# Patient Record
Sex: FEMALE | Employment: OTHER | URBAN - METROPOLITAN AREA
[De-identification: names, ages, dates, MRNs, and addresses within clinical notes are randomized per-mention and may not be internally consistent; named-entity substitution may affect disease eponyms.]

---

## 2018-02-23 ENCOUNTER — OFFICE VISIT (OUTPATIENT)
Dept: CARDIOLOGY CLINIC | Age: 83
End: 2018-02-23

## 2018-02-23 VITALS
HEIGHT: 64 IN | SYSTOLIC BLOOD PRESSURE: 140 MMHG | BODY MASS INDEX: 39.13 KG/M2 | HEART RATE: 74 BPM | DIASTOLIC BLOOD PRESSURE: 62 MMHG | RESPIRATION RATE: 18 BRPM | WEIGHT: 229.2 LBS | OXYGEN SATURATION: 98 %

## 2018-02-23 DIAGNOSIS — R06.02 SOB (SHORTNESS OF BREATH): Primary | ICD-10-CM

## 2018-02-23 DIAGNOSIS — E11.9 TYPE 2 DIABETES MELLITUS WITHOUT COMPLICATION, UNSPECIFIED LONG TERM INSULIN USE STATUS: ICD-10-CM

## 2018-02-23 DIAGNOSIS — I10 ESSENTIAL HYPERTENSION: ICD-10-CM

## 2018-02-23 NOTE — PROGRESS NOTES
Richard Silva DNP, ANP-BC  Subjective/HPI:     Pardeep Justice is a 80 y.o. female is here for new patient consultation. She reports progressive dyspnea on exertion over the last few months, she is accompanied by her daughter who also reports she has been having some increasing fatigue. Patient denies exertional chest pain chest pressure or fullness sensation. She also states on occasion she has waxing and waning right hand numbness and tingling that does not occur with any particular activity. She denies known family history of heart disease. Patient herself has a history of diabetes, hypertension. She is a non-smoker but had secondhand exposure during marriage is greater than 50 years. PCP Provider  Burgess Luis Miguel MD  Past Medical History:   Diagnosis Date    Anxiety     Diabetes ([address]     Hypertension       Past Surgical History:   Procedure Laterality Date    HX CATARACT REMOVAL Right 4/2016    HX GYN      Vaginal delivery 5 girls and 3 boys     Allergies   Allergen Reactions    Ace Inhibitors Cough    Penicillins Rash      History reviewed. No pertinent family history. Current Outpatient Prescriptions   Medication Sig    alendronate (FOSAMAX) 70 mg tablet Take  by mouth. Every 14 days    omega-3 fatty acids-vitamin e 1,000 mg cap Take 1 Cap by mouth daily.  losartan (COZAAR) 50 mg tablet Take  by mouth every morning.  citalopram (CELEXA) 20 mg tablet Take  by mouth daily.  furosemide (LASIX) 20 mg tablet Take 40 mg by mouth daily.  levothyroxine (SYNTHROID) 75 mcg tablet Take  by mouth Daily (before breakfast).  Calcium-Cholecalciferol, D3, 600 mg(1,500mg) -400 unit cap Take  by mouth daily.  cholecalciferol (VITAMIN D3) 1,000 unit cap Take  by mouth daily.  verapamil SR (CALAN-SR) 180 mg CR tablet Take 180 mg by mouth daily (with dinner).  glyBURIDE (DIABETA) 5 mg tablet Take 2.5 mg by mouth Daily (before breakfast).      No current facility-administered medications for this visit. Vitals:    02/23/18 1432 02/23/18 1448   BP: 142/60 140/62   Pulse: 74    Resp: 18    SpO2: 98%    Weight: 229 lb 3.2 oz (104 kg)    Height: 5' 4\" (1.626 m)      Social History     Social History    Marital status:      Spouse name: N/A    Number of children: N/A    Years of education: N/A     Occupational History    Not on file. Social History Main Topics    Smoking status: Never Smoker    Smokeless tobacco: Not on file    Alcohol use No    Drug use: No    Sexual activity: Not on file     Other Topics Concern    Not on file     Social History Narrative       I have reviewed the nurses notes, vitals, problem list, allergy list, medical history, family, social history and medications. Review of Symptoms:    General: Pt denies excessive weight gain or loss. Pt is able to conduct ADL's  HEENT: Denies blurred vision, headaches, epistaxis and difficulty swallowing. Respiratory: Denies shortness of breath, + PIMENTEL, wheezing or stridor. Cardiovascular: Denies precordial pain, palpitations, edema or PND  Gastrointestinal: Denies poor appetite, indigestion, abdominal pain or blood in stool  Musculoskeletal: Denies pain or swelling from muscles or joints  Neurologic: Denies tremor, paresthesias, or sensory motor disturbance  Skin: Denies rash, itching or texture change. Physical Exam:      General: Well developed, in no acute distress, cooperative and alert  HEENT: No carotid bruits, no JVD, trach is midline. Neck Supple, PEERL, EOM intact. Heart:  Normal S1/S2 negative S3 or S4. Regular, no murmur, gallop or rub.   Respiratory: Clear bilaterally x 4, no wheezing or rales  Abdomen:   Soft, non-tender, no masses, bowel sounds are active.   Extremities:  No edema, normal cap refill, no cyanosis, atraumatic. Neuro: A&Ox3, speech clear, gait stable. Skin: Skin color is normal. No rashes or lesions.  Non diaphoretic  Vascular: 2+ pulses symmetric in all extremities    Cardiographics    ECG: sinus rythm  Results for orders placed or performed during the hospital encounter of 12/30/11   EKG, 12 LEAD, INITIAL   Result Value Ref Range    Ventricular Rate 59 BPM    Atrial Rate 59 BPM    P-R Interval 334 ms    QRS Duration 96 ms    Q-T Interval 450 ms    QTC Calculation (Bezet) 445 ms    Calculated P Axis 37 degrees    Calculated R Axis -22 degrees    Calculated T Axis -41 degrees    Diagnosis       Sinus bradycardia with 1st degree AV block  Incomplete right bundle branch block  Cannot rule out Anterior infarct , age undetermined  No previous ECGs available  Confirmed by Wes Ryan ([id number]) on 1/3/2012 9:43:01 AM         Cardiology Labs:  No results found for: CHOL, CHOLX, CHLST, CHOLV, 440560, HDL, LDL, LDLC, DLDLP, TGLX, TRIGL, TRIGP, [facility], [facility]    Lab Results   Component Value Date/Time    Sodium 143 12/30/2011 04:40 PM    Potassium 3.5 12/30/2011 04:40 PM    Chloride 109 (H) 12/30/2011 04:40 PM    CO2 27 12/30/2011 04:40 PM    Anion gap 7 12/30/2011 04:40 PM    Glucose 54 (L) 12/30/2011 04:40 PM    BUN 16 12/30/2011 04:40 PM    Creatinine 1.0 12/30/2011 04:40 PM    BUN/Creatinine ratio 16 12/30/2011 04:40 PM    GFR est AA >60 12/30/2011 04:40 PM    GFR est non-AA 57 (L) 12/30/2011 04:40 PM    Calcium 8.7 12/30/2011 04:40 PM           Assessment:     Assessment:     Diagnoses and all orders for this visit:    1. SOB (shortness of breath)  -     AMB POC EKG ROUTINE W/ 12 LEADS, INTER & REP  -     2D ECHO COMPLETE ADULT (TTE) W OR WO CONTR; Future  -     LEXISCAN/CARDIOLITE, Clinic Performed; Future    2. Essential hypertension  -     2D ECHO COMPLETE ADULT (TTE) W OR WO CONTR; Future  -     LEXISCAN/CARDIOLITE, Clinic Performed; Future    3. Type 2 diabetes mellitus without complication, unspecified long term insulin use status ([facility] [address]  -     2D ECHO COMPLETE ADULT (TTE) W OR WO CONTR; Future  -     LEXISCAN/CARDIOLITE, Clinic Performed;  Future        ICD-10-CM ICD-9-CM    1. SOB (shortness of breath) R06.02 786.05 AMB POC EKG ROUTINE W/ 12 LEADS, INTER & REP      2D ECHO COMPLETE ADULT (TTE) W OR WO CONTR      STRESS TEST LEXISCAN/CARDIOLITE   2. Essential hypertension I10 401.9 2D ECHO COMPLETE ADULT (TTE) W OR WO CONTR      STRESS TEST LEXISCAN/CARDIOLITE   3. Type 2 diabetes mellitus without complication, unspecified long term insulin use status (HCC) E11.9 250.00 2D ECHO COMPLETE ADULT (TTE) W OR WO CONTR      STRESS TEST LEXISCAN/CARDIOLITE     Orders Placed This Encounter    AMB POC EKG ROUTINE W/ 12 LEADS, INTER & REP     Order Specific Question:   Reason for Exam:     Answer:   Routine    LEXISCAN/CARDIOLITE, Clinic Performed     Standing Status:   Future     Standing Expiration Date:   8/23/2018     Order Specific Question:   Reason for Exam:     Answer:   PIMENTEL    2D ECHO COMPLETE ADULT (TTE) W OR WO CONTR     Standing Status:   Future     Standing Expiration Date:   8/23/2018     Order Specific Question:   Reason for Exam:     Answer:   PIMENTEL     Order Specific Question:   Contrast Enhancement (Bubble Study, Definity, Optison) may be used if criteria listed in established evidence-based protocol has been identified. Answer:   Yes        Plan:     Patient is a 75-year-old female with history of diabetes, hypertension presenting for evaluation of increasing dyspnea on exertion. Given cardiac risk factors will evaluate with Lexiscan nuclear stress test and echocardiogram.  Recommend patient to continue enteric-coated 81 mg aspirin therapy. Follow-up with cardiology when testing complete. Dee Trinh NP    This note was created using voice recognition software. Despite editing, there may be syntax errors. [facility]    2/25/2018         Patient seen, examined by me personally. Plan discussed as detailed. Agree with note as outlined by  NP. I confirm findings in history and physical exam. No additional findings noted.  Agree with plan as outlined above.      Mona Manning MD

## 2018-02-23 NOTE — PROGRESS NOTES
1. Have you been to the ER, urgent care clinic since your last visit? Hospitalized since your last visit? No    2. Have you seen or consulted any other health care providers outside of the [address] [place] [address] since your last visit? Include any pap smears or colon screening.  No    Chief Complaint   Patient presents with    Establish Care     new pt    Shortness of Breath    Leg Swelling     to left leg

## 2019-09-27 LAB
HBA1C MFR BLD HPLC: 6.1 %
LDL-C, EXTERNAL: 105

## 2019-12-04 LAB — LDL-C, EXTERNAL: 94

## 2019-12-11 ENCOUNTER — OFFICE VISIT (OUTPATIENT)
Dept: FAMILY MEDICINE CLINIC | Age: 84
End: 2019-12-11

## 2019-12-11 VITALS
BODY MASS INDEX: 33.46 KG/M2 | HEART RATE: 55 BPM | RESPIRATION RATE: 19 BRPM | WEIGHT: 196 LBS | TEMPERATURE: 98.8 F | DIASTOLIC BLOOD PRESSURE: 78 MMHG | HEIGHT: 64 IN | SYSTOLIC BLOOD PRESSURE: 154 MMHG | OXYGEN SATURATION: 98 %

## 2019-12-11 DIAGNOSIS — I10 ESSENTIAL HYPERTENSION: ICD-10-CM

## 2019-12-11 DIAGNOSIS — E78.2 MIXED HYPERLIPIDEMIA: ICD-10-CM

## 2019-12-11 DIAGNOSIS — E03.9 ACQUIRED HYPOTHYROIDISM: ICD-10-CM

## 2019-12-11 DIAGNOSIS — E11.9 TYPE 2 DIABETES MELLITUS WITHOUT COMPLICATION, UNSPECIFIED WHETHER LONG TERM INSULIN USE (HCC): Primary | ICD-10-CM

## 2019-12-11 PROBLEM — F32.A DEPRESSIVE DISORDER: Status: ACTIVE | Noted: 2019-12-11

## 2019-12-11 RX ORDER — FUROSEMIDE 40 MG/1
TABLET ORAL
COMMUNITY
Start: 2019-11-03 | End: 2020-01-15 | Stop reason: SDUPTHER

## 2019-12-11 RX ORDER — ASPIRIN 81 MG/1
TABLET ORAL DAILY
COMMUNITY
End: 2020-11-30 | Stop reason: SDUPTHER

## 2019-12-11 RX ORDER — DICLOFENAC SODIUM 10 MG/G
2 GEL TOPICAL 4 TIMES DAILY
Qty: 100 G | Refills: 3 | Status: SHIPPED | OUTPATIENT
Start: 2019-12-11 | End: 2020-01-02 | Stop reason: SDUPTHER

## 2019-12-11 NOTE — PROGRESS NOTES
Chief Complaint   Patient presents with   [facility] Maintenance reviewed     1. Have you been to the ER, urgent care clinic since your last visit? Hospitalized since your last visit? no    2. Have you seen or consulted any other health care providers outside of the [address] [place] [address] since your last visit? Include any pap smears or colon screening.  No

## 2019-12-11 NOTE — PROGRESS NOTES
Chief Complaint   Patient presents with   [address]     Pt is transferring care from Dr. Ammon Pitt. Pt reports that she has knee problems, but does not want to have any surgery. Pt did have a knee injection which provided significant relief. Pt is taking all medications listed. Pt walks with a cane, is in a wheelchair during her visit. Pt stated that she has a strong ramiro which will take care of her. Subjective: (As above and below)     Chief Complaint   Patient presents with   [address]     she is a 80y.o. year old female who presents for evaluation. Reviewed PmHx, RxHx, FmHx, SocHx, AllgHx and updated in chart. Review of Systems - negative except as listed above    Objective:     Vitals:    12/11/19 1032   BP: 154/78   Pulse: (!) 55   Resp: 19   Temp: 98.8 °F (37.1 °C)   TempSrc: Oral   SpO2: 98%   Weight: 196 lb (88.9 kg)   Height: 5' 4\" (1.626 m)     Physical Examination: General appearance - alert, well appearing, and in no distress  Mental status - normal mood, behavior, speech, dress, motor activity, and thought processes  Mouth - mucous membranes moist, pharynx normal without lesions  Chest - clear to auscultation, no wheezes, rales or rhonchi, symmetric air entry  Heart - normal rate, regular rhythm, normal S1, S2, no murmurs, rubs, clicks or gallops  Musculoskeletal - in a wheelchair, walks with a cane  Extremities - peripheral pulses normal, no pedal edema, no clubbing or cyanosis    Assessment/ Plan:   1. Type 2 diabetes mellitus without complication, unspecified whether long term insulin use ([facility] [address]  -pt denies taking medication, reports that she is diet controlled only    2. Essential hypertension  -elevated today    3. Mixed hyperlipidemia  -labs UTD per records    4. Acquired hypothyroidism  -continue on current medications       I have discussed the diagnosis with the patient and the intended plan as seen in the above orders.   The patient has received an after-visit summary and questions were answered concerning future plans.      Medication Side Effects and Warnings were discussed with patient: yes  Patient Labs were reviewed: yes  Patient Past Records were reviewed:  yes    Missy Dior M.D.

## 2020-01-02 RX ORDER — DICLOFENAC SODIUM 10 MG/G
2 GEL TOPICAL 4 TIMES DAILY
Qty: 100 G | Refills: 3 | Status: SHIPPED | OUTPATIENT
Start: 2020-01-02

## 2020-01-02 NOTE — TELEPHONE ENCOUNTER
Message is from [facility]    Dr. Ann-Marie Carpenter   Received: Today   Message Contents   [address] [facility]   Phone Number: [phone]               Caller (if not patient): Timbo Nolan   Relationship of caller (if not patient): daughter   Best contact number(s): [phone]   Name of medication and dosage if known: \"diclofenac gel 1%\"  rx #[id number]   Is patient out of this medication (yes/no): Yes   Pharmacy name: [facility]   Pharmacy listed in chart? (yes/no): Yes   Pharmacy phone number: unknown   Date of last visit: 12/11/19   Details to clarify the request: This is the 3rd request for mail ordered meds.  Pharmacy will not refill meds because they need more information from the doctor. Requested Prescriptions     Pending Prescriptions Disp Refills    diclofenac (VOLTAREN) 1 % gel 100 g 3     Sig: Apply 2 g to affected area four (4) times daily.

## 2020-01-15 NOTE — TELEPHONE ENCOUNTER
Daughter is calling requesting a refill on pt's med daughter's # [phone]    Requested Prescriptions     Pending Prescriptions Disp Refills    levothyroxine (SYNTHROID) 75 mcg tablet       Sig: Take  by mouth Daily (before breakfast).  citalopram (CELEXA) 20 mg tablet       Sig: Take  by mouth daily.  furosemide (LASIX) 40 mg tablet      verapamil ER (CALAN-SR) 180 mg CR tablet       Sig: Take 1 Tab by mouth daily (with dinner).  losartan (COZAAR) 50 mg tablet       Sig: Take  by mouth every morning.

## 2020-01-17 RX ORDER — LOSARTAN POTASSIUM 50 MG/1
50 TABLET ORAL
Qty: 90 TAB | Refills: 3 | Status: SHIPPED | OUTPATIENT
Start: 2020-01-17 | End: 2020-04-23 | Stop reason: SDUPTHER

## 2020-01-17 RX ORDER — FUROSEMIDE 40 MG/1
40 TABLET ORAL DAILY
Qty: 90 TAB | Refills: 3 | Status: SHIPPED | OUTPATIENT
Start: 2020-01-17 | End: 2020-04-23 | Stop reason: SDUPTHER

## 2020-01-17 RX ORDER — CITALOPRAM 20 MG/1
20 TABLET, FILM COATED ORAL DAILY
Qty: 90 TAB | Refills: 3 | Status: SHIPPED | OUTPATIENT
Start: 2020-01-17 | End: 2020-04-23 | Stop reason: SDUPTHER

## 2020-01-17 RX ORDER — VERAPAMIL HYDROCHLORIDE 180 MG/1
180 TABLET, EXTENDED RELEASE ORAL
Qty: 90 TAB | Refills: 3 | Status: SHIPPED | OUTPATIENT
Start: 2020-01-17 | End: 2020-04-23 | Stop reason: SDUPTHER

## 2020-01-17 RX ORDER — LEVOTHYROXINE SODIUM 75 UG/1
75 TABLET ORAL
Qty: 90 TAB | Refills: 3 | Status: SHIPPED | OUTPATIENT
Start: 2020-01-17 | End: 2020-04-23 | Stop reason: SDUPTHER

## 2020-01-23 ENCOUNTER — TELEPHONE (OUTPATIENT)
Dept: FAMILY MEDICINE CLINIC | Age: 85
End: 2020-01-23

## 2020-01-23 NOTE — TELEPHONE ENCOUNTER
Message from [facility]    Dr. Wilson/ Telephone   Received: Today   Message Contents   [place], [address]   Phone Number:  [phone] (Call me)             Silvestre Mckeon, patient's daughter is returning a call from the office . If there is no answer please call [phone].

## 2020-01-23 NOTE — TELEPHONE ENCOUNTER
She did call insurance and was told she needs to call medicaid and social security. In the mean time she is requesting all six of her prescriptions to all be generic if possible. oJhn Fonseca requests a callback.     # [phone] or  [phone]

## 2020-01-23 NOTE — TELEPHONE ENCOUNTER
Caller says she recently requested refills on behalf of patient. When patient received the medication, there was a copay. Caller says they have not had to pay a copay before. Is there a difference in charge for generic vs brand name?   # [phone]

## 2020-01-23 NOTE — TELEPHONE ENCOUNTER
Please advise that all medications written are written as generic medications. Was there a particular medication that was more expensive?

## 2020-01-24 NOTE — TELEPHONE ENCOUNTER
Called pts daughter, verified name and . Informed pt that per Dr. Dottie Sanders she needs to reach out to her insurance company regarding wheelchair request, will also need to schedule face-to-face evaluation with our office. We will also discuss need for Ensure at that time. Daughter stated that she informed the nurse she spoke with this morning that she has already called her insurance co and they are saying that they need a letter from the doctor stating that she needs a wheelchair. Daughter will call back later this week and schedule an appt once she knows her schedule.

## 2020-01-24 NOTE — TELEPHONE ENCOUNTER
Please advise patient to reach out to her insurance company regarding wheelchair request, will also need to schedule face-to-face evaluation with our office. We will also discuss need for Ensure at that time.

## 2020-01-24 NOTE — TELEPHONE ENCOUNTER
Called both numbers listed on file.   LM to return call to [phone]  VM full on [phone]

## 2020-01-24 NOTE — TELEPHONE ENCOUNTER
verified by daughter. She request an order for motorized wheelchair r/t knees, leg, and back pain. Informed daughter that a face-to-face is usually required. Daughter states that she will schedule an appointment if that is required. She also requests Rx for Ensure since patient for lack of appetite.

## 2020-02-21 ENCOUNTER — TELEPHONE (OUTPATIENT)
Dept: FAMILY MEDICINE CLINIC | Age: 85
End: 2020-02-21

## 2020-02-21 NOTE — TELEPHONE ENCOUNTER
Called pt, verified name and . She stated she is having pains in chest and sob but she just wants us to call in something for her, she does not want to come in and be seen. I explained to her that we would have to evaluate her so that we could know exactly what is going on. I also recommended her calling [facility] if symptoms got worse. Pt denied any help or care.

## 2020-02-21 NOTE — TELEPHONE ENCOUNTER
Caller is not listed on patient's PHI. Caller requested a nurse give patient a call at [phone] to discuss pains in chest.  Caller would like nurse to convince patient to be seen by a doctor.

## 2020-04-23 RX ORDER — ALENDRONATE SODIUM 70 MG/1
70 TABLET ORAL
Qty: 12 TAB | Refills: 1 | Status: SHIPPED | OUTPATIENT
Start: 2020-04-23 | End: 2020-11-30 | Stop reason: SDUPTHER

## 2020-04-23 RX ORDER — FUROSEMIDE 40 MG/1
40 TABLET ORAL DAILY
Qty: 90 TAB | Refills: 1 | Status: SHIPPED | OUTPATIENT
Start: 2020-04-23 | End: 2020-11-30 | Stop reason: SDUPTHER

## 2020-04-23 RX ORDER — LEVOTHYROXINE SODIUM 75 UG/1
75 TABLET ORAL
Qty: 90 TAB | Refills: 1 | Status: SHIPPED | OUTPATIENT
Start: 2020-04-23 | End: 2020-11-30 | Stop reason: SDUPTHER

## 2020-04-23 RX ORDER — CITALOPRAM 20 MG/1
20 TABLET, FILM COATED ORAL DAILY
Qty: 90 TAB | Refills: 1 | Status: SHIPPED | OUTPATIENT
Start: 2020-04-23 | End: 2020-11-30 | Stop reason: SDUPTHER

## 2020-04-23 RX ORDER — LOSARTAN POTASSIUM 50 MG/1
50 TABLET ORAL
Qty: 90 TAB | Refills: 1 | Status: SHIPPED | OUTPATIENT
Start: 2020-04-23 | End: 2020-11-30 | Stop reason: SDUPTHER

## 2020-04-23 RX ORDER — VERAPAMIL HYDROCHLORIDE 180 MG/1
180 TABLET, EXTENDED RELEASE ORAL
Qty: 90 TAB | Refills: 1 | Status: SHIPPED | OUTPATIENT
Start: 2020-04-23 | End: 2020-11-30 | Stop reason: SDUPTHER

## 2020-04-23 NOTE — TELEPHONE ENCOUNTER
Message from Kamilla Wilson/ refill   Received: Erinn Estrada   Message Contents   [place], [address] Office Pool             Medication Refill     Caller (if not patient):       Relationship of caller (if not patient):Betty Awad  , daughter       Best contact number(s): [phone]       Name of medication and dosage if known: All six medications       Is patient out of this medication (yes/no):no       Pharmacy name: [address] listed in chart? (yes/no):yes   Pharmacy phone number:       Details to clarify the request:       Katie Katz        Requested Prescriptions     Pending Prescriptions Disp Refills    verapamil ER (CALAN-SR) 180 mg CR tablet 90 Tab 3     Sig: Take 1 Tab by mouth daily (with dinner).  losartan (COZAAR) 50 mg tablet 90 Tab 3     Sig: Take 1 Tab by mouth every morning.  levothyroxine (SYNTHROID) 75 mcg tablet 90 Tab 3     Sig: Take 1 Tab by mouth Daily (before breakfast).  furosemide (LASIX) 40 mg tablet 90 Tab 3     Sig: Take 1 Tab by mouth daily.  citalopram (CELEXA) 20 mg tablet 90 Tab 3     Sig: Take 1 Tab by mouth daily.  alendronate (FOSAMAX) 70 mg tablet       Sig: Take  by mouth.  Every 14 days

## 2020-05-08 ENCOUNTER — TELEPHONE (OUTPATIENT)
Dept: FAMILY MEDICINE CLINIC | Age: 85
End: 2020-05-08

## 2020-05-08 RX ORDER — LORATADINE 5 MG/5ML
SOLUTION ORAL
Qty: 3 PACKAGE | Refills: 11 | Status: SHIPPED | OUTPATIENT
Start: 2020-05-08 | End: 2020-05-12 | Stop reason: SDUPTHER

## 2020-05-08 NOTE — TELEPHONE ENCOUNTER
Please advise that order was written for briefs, x-large. Pt cronin snot qualify for Ensure, it is only covered by insurance if it is the only source of nutrition for patient.

## 2020-05-08 NOTE — TELEPHONE ENCOUNTER
Patient's daughter Toshia Garrison is requesting for a prescription order for X-Large depends (pull up kind) and Insure. Patient insurance says, \"if the doctor can write a prescription it will be cheaper for the patient\".      Toshia Garrison can be reached at [phone] & [phone]

## 2020-05-11 NOTE — TELEPHONE ENCOUNTER
Called [facility] and they do not carry disposable briefs.  We will need to forward this order to a medical supply co.

## 2020-05-11 NOTE — TELEPHONE ENCOUNTER
Called pt, verified name and . Scheduled appt Wed at 18 with Dr Wilson. Daughter inquired about briefs rx bc [employer] states they do not have it. Will call [employer].

## 2020-05-11 NOTE — TELEPHONE ENCOUNTER
If patient is not able to eat any regular food, please schedule virtual visit so that this can be documented and evaluated, possibly justify Ensure supplements.

## 2020-05-11 NOTE — TELEPHONE ENCOUNTER
Called pts daughter, verified name and . Informed daughter that per Dr. Mt Miranda order was written for briefs, x-large.      Informed her that pt does not qualify for Ensure, it is only covered by insurance if it is the only source of nutrition for patient. Daughter stated that this is the only source of nutrition pt is getting at this time, but the family has been paying out of pocket for it.

## 2020-05-12 RX ORDER — LORATADINE 5 MG/5ML
SOLUTION ORAL
Qty: 3 PACKAGE | Refills: 11 | Status: SHIPPED | OUTPATIENT
Start: 2020-05-12 | End: 2021-01-21 | Stop reason: SDUPTHER

## 2020-05-13 ENCOUNTER — VIRTUAL VISIT (OUTPATIENT)
Dept: FAMILY MEDICINE CLINIC | Age: 85
End: 2020-05-13

## 2020-05-13 VITALS — HEIGHT: 64 IN | WEIGHT: 198 LBS | BODY MASS INDEX: 33.8 KG/M2

## 2020-05-13 DIAGNOSIS — E78.2 MIXED HYPERLIPIDEMIA: ICD-10-CM

## 2020-05-13 DIAGNOSIS — E11.9 TYPE 2 DIABETES MELLITUS WITHOUT COMPLICATION, UNSPECIFIED WHETHER LONG TERM INSULIN USE (HCC): Primary | ICD-10-CM

## 2020-05-13 DIAGNOSIS — I10 ESSENTIAL HYPERTENSION: ICD-10-CM

## 2020-05-13 DIAGNOSIS — R63.8 UNABLE TO EAT SOLID FOODS: ICD-10-CM

## 2020-05-13 DIAGNOSIS — E03.9 ACQUIRED HYPOTHYROIDISM: ICD-10-CM

## 2020-05-13 NOTE — PROGRESS NOTES
Chief Complaint   Patient presents with    Nutrition     ensure dependent     Pt was seen via doxy. me video visit. Pt reports that she has been having a hard time eating. Pt states everything \"tastes like nothing\"    Pt reports that she does not have a taste for anything, only drinks ensure. Pt denies any abdominal pain, no nausea, no diarrhea. Daughter reports that pt will only drink water, drink ensure. Tries to get her to eat other foods, but she will spit it out. Pt is resistant to the idea of any testing or evaluation, does not feel like anything is wrong, but is still refusing to eat any solid food. Daughter reports that she thinks it is just due to age. Daughter also reports that pt sleeps a lot during the day, more than she used to. Pt fell asleep in her chair during her visit today as daughter and I were talking. Daughter also reports that pt will talk to and wave to the television, will talk to people on TV and thinks that they are talking to her. Hong Altman is a 80 y.o. female who was seen by synchronous (real-time) audio-video technology on 5/13/2020. Consent: Hong Almtan, who was seen by synchronous (real-time) audio-video technology, and/or her healthcare decision maker, is aware that this patient-initiated, Telehealth encounter on 5/13/2020 is a billable service, with coverage as determined by her insurance carrier. She is aware that she may receive a bill and has provided verbal consent to proceed: Yes. Assessment & Plan:   1. Type 2 diabetes mellitus without complication, unspecified whether long term insulin use (HCC)  -check fasting labs  - METABOLIC PANEL, COMPREHENSIVE; Future  - HEMOGLOBIN A1C WITH EAG; Future    2. Mixed hyperlipidemia  -check labs  - METABOLIC PANEL, COMPREHENSIVE; Future  - LIPID PANEL; Future    3. Acquired hypothyroidism  -continue on current medication  - TSH 3RD GENERATION; Future    4.  Essential hypertension  - CBC W/O DIFF; Future    5. Unable to eat solid foods  -refer for further evaluation  - REFERRAL TO GASTROENTEROLOGY          Subjective:   Scooby Agosto is a 80 y.o. female who was seen for Nutrition (ensure dependent)      Prior to Admission medications    Medication Sig Start Date End Date Taking? Authorizing Provider   brief disposable (Briefs, Adult-Extra Large) misc by Does Not Apply route. 5/12/20  Yes Marciano Wilson MD   verapamil ER (CALAN-SR) 180 mg CR tablet Take 1 Tab by mouth daily (with dinner). 4/23/20  Yes Marciano Wilson MD   losartan (COZAAR) 50 mg tablet Take 1 Tab by mouth every morning. 4/23/20  Yes Marciano Wilson MD   levothyroxine (SYNTHROID) 75 mcg tablet Take 1 Tab by mouth Daily (before breakfast). 4/23/20  Yes Marciano Wilson MD   furosemide (LASIX) 40 mg tablet Take 1 Tab by mouth daily. 4/23/20  Yes Marciano Wilson MD   citalopram (CELEXA) 20 mg tablet Take 1 Tab by mouth daily. 4/23/20  Yes Marciano Wilson MD   alendronate (FOSAMAX) 70 mg tablet Take 1 Tab by mouth every seven (7) days. Every 14 days 4/23/20  Yes Marciano Wilson MD   diclofenac (VOLTAREN) 1 % gel Apply 2 g to affected area four (4) times daily. 1/2/20  Yes Marciano Wilson MD   aspirin delayed-release 81 mg tablet Take  by mouth daily. Yes Provider, Historical     Allergies   Allergen Reactions    Ace Inhibitors Cough    Penicillins Rash       Patient Active Problem List   Diagnosis Code    Hypertension I10    Diabetes ([facility] [address] E11.9    Mixed hyperlipidemia E78.2    Hypothyroidism E03.9    Depressive disorder F32.9       Review of Systems   Constitutional: Positive for malaise/fatigue. Negative for chills and fever. HENT: Negative for congestion. Eyes: Negative for blurred vision. Respiratory: Negative for cough and shortness of breath. Cardiovascular: Negative for chest pain and palpitations.    Gastrointestinal: Negative for heartburn, nausea and vomiting. Genitourinary: Negative for dysuria. Musculoskeletal: Negative for back pain and myalgias. Neurological: Negative for dizziness, tingling and headaches. Objective:   Vital Signs: (As obtained by patient/caregiver at home)  Visit Vitals  Ht 5' 4\" (1.626 m)   Wt 198 lb (89.8 kg)   BMI 33.99 kg/m²        [INSTRUCTIONS:  \"[x]\" Indicates a positive item  \"[]\" Indicates a negative item  -- DELETE ALL ITEMS NOT EXAMINED]    Constitutional: [x] Appears well-developed and well-nourished [x] No apparent distress      [] Abnormal -     Mental status: [x] Alert and awake  [x] Oriented to person/place/time [x] Able to follow commands    [] Abnormal -     Eyes:   EOM    [x]  Normal    [] Abnormal -   Sclera  [x]  Normal    [] Abnormal -          Discharge [x]  None visible   [] Abnormal -     HENT: [x] Normocephalic, atraumatic  [] Abnormal -   [x] Mouth/Throat: Mucous membranes are moist    External Ears [x] Normal  [] Abnormal -    Neck: [x] No visualized mass [] Abnormal -     Pulmonary/Chest: [x] Respiratory effort normal   [x] No visualized signs of difficulty breathing or respiratory distress        [] Abnormal -      Musculoskeletal:   [x] Normal gait with no signs of ataxia         [x] Normal range of motion of neck        [] Abnormal -     Neurological:        [x] No Facial Asymmetry (Cranial nerve 7 motor function) (limited exam due to video visit)          [x] No gaze palsy        [] Abnormal -          Skin:        [x] No significant exanthematous lesions or discoloration noted on facial skin         [] Abnormal -            Psychiatric:       [x] Normal Affect [] Abnormal -        [x] No Hallucinations    Other pertinent observable physical exam findings:-        We discussed the expected course, resolution and complications of the diagnosis(es) in detail. Medication risks, benefits, costs, interactions, and alternatives were discussed as indicated.   I advised her to contact the office if her condition worsens, changes or fails to improve as anticipated. She expressed understanding with the diagnosis(es) and plan. Arianna Brenner is a 80 y.o. female who was evaluated by a video visit encounter for concerns as above. Patient identification was verified prior to start of the visit. A caregiver was present when appropriate. Due to this being a TeleHealth encounter (During SLLUQ-68 public health emergency), evaluation of the following organ systems was limited: Vitals/Constitutional/EENT/Resp/CV/GI//MS/Neuro/Skin/Heme-Lymph-Imm. Pursuant to the emergency declaration under the [facility]5 waiver authority and the [employer] Act, this Virtual  Visit was conducted, with patient's (and/or legal guardian's) consent, to reduce the patient's risk of exposure to COVID-19 and provide necessary medical care. Services were provided through a video synchronous discussion virtually to substitute for in-person clinic visit. Patient and provider were located at their individual homes.       Juani Romero MD

## 2020-05-13 NOTE — PROGRESS NOTES
Chief Complaint   Patient presents with    Nutrition     ensure dependent       1. Have you been to the ER, urgent care clinic since your last visit? Hospitalized since your last visit? No    2. Have you seen or consulted any other health care providers outside of the [address] [place] [address] since your last visit? Include any pap smears or colon screening.  No    Health Maintenance Due   Topic Date Due    Foot Exam Q1  05/18/1940    MICROALBUMIN Q1  05/18/1940    Eye Exam Retinal or Dilated  [birth year]18/1940    Bone Densitometry (Dexa) Screening  05/18/1995

## 2020-05-21 ENCOUNTER — TELEPHONE (OUTPATIENT)
Dept: FAMILY MEDICINE CLINIC | Age: 85
End: 2020-05-21

## 2020-08-01 ENCOUNTER — HOSPITAL ENCOUNTER (EMERGENCY)
Age: 85
Discharge: HOME | End: 2020-08-01
Attending: EMERGENCY MEDICINE
Payer: MEDICARE

## 2020-08-01 ENCOUNTER — APPOINTMENT (OUTPATIENT)
Dept: GENERAL RADIOLOGY | Age: 85
End: 2020-08-01
Payer: MEDICARE

## 2020-08-01 ENCOUNTER — APPOINTMENT (OUTPATIENT)
Dept: GENERAL RADIOLOGY | Age: 85
End: 2020-08-01
Attending: EMERGENCY MEDICINE
Payer: MEDICARE

## 2020-08-01 VITALS
TEMPERATURE: 98.5 F | RESPIRATION RATE: 20 BRPM | HEART RATE: 65 BPM | OXYGEN SATURATION: 100 % | HEIGHT: 64 IN | DIASTOLIC BLOOD PRESSURE: 95 MMHG | BODY MASS INDEX: 33.8 KG/M2 | SYSTOLIC BLOOD PRESSURE: 187 MMHG | WEIGHT: 198 LBS

## 2020-08-01 DIAGNOSIS — S22.42XA CLOSED FRACTURE OF MULTIPLE RIBS OF LEFT SIDE, INITIAL ENCOUNTER: Primary | ICD-10-CM
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
## 2020-08-01 PROCEDURE — 77030027138 HC INCENT SPIROMETER -A

## 2020-08-01 PROCEDURE — 74011250637 HC RX REV CODE- 250/637: Performed by: PHYSICIAN ASSISTANT

## 2020-08-01 PROCEDURE — 74011000250 HC RX REV CODE- 250: Performed by: PHYSICIAN ASSISTANT

## 2020-08-01 PROCEDURE — 99284 EMERGENCY DEPT VISIT MOD MDM: CPT

## 2020-08-01 PROCEDURE — 71101 X-RAY EXAM UNILAT RIBS/CHEST: CPT

## 2020-08-01 RX ORDER — TRAMADOL HYDROCHLORIDE 50 MG/1
50 TABLET ORAL
Qty: 10 TAB | Refills: 0 | Status: SHIPPED | OUTPATIENT
Start: 2020-08-01 | End: 2020-08-04 | Stop reason: SDUPTHER

## 2020-08-01 RX ORDER — LIDOCAINE 50 MG/G
PATCH TOPICAL
Qty: 5 EACH | Refills: 0 | Status: SHIPPED | OUTPATIENT
Start: 2020-08-01 | End: 2020-08-04 | Stop reason: SDUPTHER

## 2020-08-01 RX ORDER — TRAMADOL HYDROCHLORIDE 50 MG/1
50 TABLET ORAL
Status: COMPLETED | OUTPATIENT
Start: 2020-08-01 | End: 2020-08-01

## 2020-08-01 RX ORDER — ACETAMINOPHEN 500 MG
1000 TABLET ORAL ONCE
Status: DISCONTINUED | OUTPATIENT
Start: 2020-08-01 | End: 2020-08-01

## 2020-08-01 RX ORDER — LIDOCAINE 4 G/100G
1 PATCH TOPICAL
Status: DISCONTINUED | OUTPATIENT
Start: 2020-08-01 | End: 2020-08-01 | Stop reason: HOSPADM

## 2020-08-01 RX ADMIN — TRAMADOL HYDROCHLORIDE 50 MG: 50 TABLET, FILM COATED ORAL at 16:33

## 2020-08-01 NOTE — ED PROVIDER NOTES
EMERGENCY DEPARTMENT HISTORY AND PHYSICAL EXAM      Date: 8/1/2020  Patient Name: Antolin Montes    History of Presenting Illness     Chief Complaint   Patient presents with    Fall     fall when she came out her bathroom. fell and struck her left ribs. did not hit her head or pass out. History Provided By: Patient and Patient's Daughter    HPI: Antolin Montes, 80 y.o. female presents ambulatory to the Emergency Dept with her daughter in attendance, c/o losing her balance when she came out of the bathroom and struck her L chest wall onto the door frame. She denied striking her head. No LOC. No head, neck or back pain. She denied bruising/bleeding. She denied use of blood thinners. She denied dizziness, confusion, or weakness. She denied hip pain. No dysuria/hematuria. She denied shortness of breath or cough but states taking a deep breath or movement causes increased discomfort. Pt is o/w healthy without fever, chills, congestion, ST, N/V/D. Chief Complaint: L rib pain after fall into a doorway  Duration: 1 Days  Timing:  Acute  Location: L ribs  Quality: Aching  Severity: Moderate  Modifying Factors: increased pain with movement, deep inspiration  Associated Symptoms: denies any other associated signs or symptoms        There are no other complaints, changes, or physical findings at this time. PCP: Vahid Wilson MD    Current Facility-Administered Medications   Medication Dose Route Frequency Provider Last Rate Last Dose    lidocaine 4 % patch 1 Patch  1 Patch TransDERmal NOW Norvel Jana Gonzalest, [address]   1 Patch at 08/01/20 1633     Current Outpatient Medications   Medication Sig Dispense Refill    traMADoL (Ultram) 50 mg tablet Take 1 Tab by mouth every six (6) hours as needed for Pain for up to 3 days. Max Daily Amount: 200 mg. 10 Tab 0    lidocaine (LIDODERM) 5 % Apply patch to the affected area for 12 hours a day and remove for 12 hours a day.  5 Each 0    brief disposable (Briefs, Adult-Extra Large) misc by Does Not Apply route. 3 Package 11    verapamil ER (CALAN-SR) 180 mg CR tablet Take 1 Tab by mouth daily (with dinner). 90 Tab 1    losartan (COZAAR) 50 mg tablet Take 1 Tab by mouth every morning. 90 Tab 1    levothyroxine (SYNTHROID) 75 mcg tablet Take 1 Tab by mouth Daily (before breakfast). 90 Tab 1    furosemide (LASIX) 40 mg tablet Take 1 Tab by mouth daily. 90 Tab 1    citalopram (CELEXA) 20 mg tablet Take 1 Tab by mouth daily. 90 Tab 1    alendronate (FOSAMAX) 70 mg tablet Take 1 Tab by mouth every seven (7) days. Every 14 days 12 Tab 1    diclofenac (VOLTAREN) 1 % gel Apply 2 g to affected area four (4) times daily. 100 g 3    aspirin delayed-release 81 mg tablet Take  by mouth daily. Past History     Past Medical History:  Past Medical History:   Diagnosis Date    Anxiety     Diabetes ([facility] [address]     Hypertension        Past Surgical History:  Past Surgical History:   Procedure Laterality Date    HX CATARACT REMOVAL Right 4/2016    HX GYN      Vaginal delivery 5 girls and 3 boys       Family History:  Family History   Problem Relation Age of Onset    Hypertension Mother     Hypertension Father     Cancer Father     Cancer Sister     Cancer Brother        Social History:  Social History     Tobacco Use    Smoking status: Never Smoker    Smokeless tobacco: Never Used   Substance Use Topics    Alcohol use: No    Drug use: No       Allergies: Allergies   Allergen Reactions    Ace Inhibitors Cough    Penicillins Rash         Review of Systems   Review of Systems   Constitutional: Negative for chills and fever. HENT: Negative for congestion, rhinorrhea and sore throat. Eyes: Negative for photophobia and visual disturbance. Respiratory: Negative for cough and shortness of breath. Cardiovascular: Positive for chest pain. Negative for palpitations. See HPI   Gastrointestinal: Negative for diarrhea, nausea and vomiting.    Genitourinary: Negative for dysuria and hematuria. Musculoskeletal: Negative for neck pain and neck stiffness. Skin: Negative for rash and wound. Allergic/Immunologic: Negative for food allergies and immunocompromised state. Neurological: Negative for dizziness, weakness and headaches. Hematological: Negative for adenopathy. Does not bruise/bleed easily. Psychiatric/Behavioral: Negative for agitation and confusion. All other systems reviewed and are negative. Physical Exam   Physical Exam  Vitals signs and nursing note reviewed. Constitutional:       General: She is not in acute distress. Appearance: Normal appearance. She is well-developed and normal weight. She is not ill-appearing, toxic-appearing or diaphoretic. HENT:      Head: Normocephalic and atraumatic. Nose: Nose normal.      Mouth/Throat:      Mouth: Mucous membranes are moist.      Pharynx: No oropharyngeal exudate. Eyes:      General: No scleral icterus. Right eye: No discharge. Left eye: No discharge. Extraocular Movements: Extraocular movements intact. Conjunctiva/sclera: Conjunctivae normal.      Pupils: Pupils are equal, round, and reactive to light. Neck:      Musculoskeletal: Normal range of motion and neck supple. No muscular tenderness. Thyroid: No thyromegaly. Vascular: No JVD. Trachea: No tracheal deviation. Cardiovascular:      Rate and Rhythm: Normal rate and regular rhythm. Pulses: Normal pulses. Heart sounds: Normal heart sounds. Pulmonary:      Effort: Pulmonary effort is normal. No respiratory distress. Breath sounds: Normal breath sounds. No wheezing. Comments: Tender to lateral aspect of L lower ribcage, no ecchymosis, no mass, no step off, no crepitus  Chest:      Chest wall: Tenderness present. Abdominal:      Palpations: Abdomen is soft. Tenderness: There is no abdominal tenderness.  There is no right CVA tenderness, left CVA tenderness, guarding or rebound. Musculoskeletal: Normal range of motion. General: No tenderness. Skin:     General: Skin is warm and dry. Findings: No bruising. Neurological:      General: No focal deficit present. Mental Status: She is alert and oriented to person, place, and time. Motor: No abnormal muscle tone. Coordination: Coordination normal.   Psychiatric:         Mood and Affect: Mood normal.         Behavior: Behavior normal.         Judgment: Judgment normal.         Diagnostic Study Results     Labs -   No results found for this or any previous visit (from the past 12 hour(s)). Radiologic Studies -   XR RIBS LT W PA CXR MIN 3 V   Final Result   IMPRESSION: Nondisplaced left eighth and ninth lateral rib fractures. No   pneumothorax. Medical Decision Making   I am the first provider for this patient. I reviewed the vital signs, available nursing notes, past medical history, past surgical history, family history and social history. Vital Signs-Reviewed the patient's vital signs. Patient Vitals for the past 12 hrs:   Temp Pulse Resp BP SpO2   08/01/20 1732  65  (!) 187/95    08/01/20 1650  67 20 (!) 203/86 100 %   08/01/20 1459 98.5 °F (36.9 °C) 66 16 160/85 100 %         Records Reviewed: Nursing Notes, Old Medical Records, Previous Radiology Studies and Previous Laboratory Studies    Provider Notes (Medical Decision Making):   Rib fx, pneumothorax, hemothorax, contusion    ED Course:   Initial assessment performed. The patients presenting problems have been discussed, and they are in agreement with the care plan formulated and outlined with them. I have encouraged them to ask questions as they arise throughout their visit. DISCHARGE NOTE:  The care plan has been outline with the patient and/or family, who verbally conveyed understanding and agreement. Available results have been reviewed.  Patient and/or family understand the follow up plan as outlined and discharge instructions. Should their condition deterioration at any time after discharge the patient agrees to return, follow up sooner than outlined or seek medical assistance at the closest Emergency Room as soon as possible. Questions have been answered. Discharge instructions and educational information regarding the patient's diagnosis as well a list of reasons why the patient would want to seek immediate medical attention, should their condition change, were reviewed directly with the patient/family        PLAN:  1. Discharge Medication List as of 8/1/2020  5:02 PM      START taking these medications    Details   traMADoL (Ultram) 50 mg tablet Take 1 Tab by mouth every six (6) hours as needed for Pain for up to 3 days. Max Daily Amount: 200 mg., Normal, Disp-10 Tab,R-0      lidocaine (LIDODERM) 5 % Apply patch to the affected area for 12 hours a day and remove for 12 hours a day., Normal, Disp-5 Each,R-0         CONTINUE these medications which have NOT CHANGED    Details   brief disposable (Briefs, Adult-Extra Large) misc by Does Not Apply route. , Print, Disp-3 Package, R-11      verapamil ER (CALAN-SR) 180 mg CR tablet Take 1 Tab by mouth daily (with dinner). , Normal, Disp-90 Tab, R-1      losartan (COZAAR) 50 mg tablet Take 1 Tab by mouth every morning., Normal, Disp-90 Tab, R-1      levothyroxine (SYNTHROID) 75 mcg tablet Take 1 Tab by mouth Daily (before breakfast). , Normal, Disp-90 Tab, R-1      furosemide (LASIX) 40 mg tablet Take 1 Tab by mouth daily. , Normal, Disp-90 Tab, R-1      citalopram (CELEXA) 20 mg tablet Take 1 Tab by mouth daily. , Normal, Disp-90 Tab, R-1      alendronate (FOSAMAX) 70 mg tablet Take 1 Tab by mouth every seven (7) days. Every 14 days, Normal, Disp-12 Tab, R-1      diclofenac (VOLTAREN) 1 % gel Apply 2 g to affected area four (4) times daily. , Normal, Disp-100 g, R-3      aspirin delayed-release 81 mg tablet Take  by mouth daily. , Historical Med           2.    Follow-up Information     Follow up With Specialties Details Why Contact Info    Brittani Wilson, [address] [address] [address]   [address] [place] [address] [phone]      [facility] EMERGENCY DEPT Emergency Medicine  If symptoms worsen [facility]  [address] [facility]  [phone]        Return to ED if worse     Diagnosis     Clinical Impression:   1. Closed fracture of multiple ribs of left side, initial encounter    2.  Fall, initial encounter

## 2020-08-01 NOTE — ED NOTES
Patient presents to ED after fall. Patient reported that she hit her left side when she fell after tripping over something. Patient alert, responding appropriately. Patient daughter at bedside. 1810-Patient brief soiled. Patient brief and linens changed. Patient BP reassessed. Patient BP ok with provider. Pt daughter reported pt had not yet taken her BP medications. Patient ok for discharge. Patient discharge instructions reviewed with patient and daughter. Patient and daughter verbalized understanding. Patient given incentive spirometer and patient and daughter instructed on use. Patient able to get into wheelchair with x2 person assistance. Patient transported off unit via wheelchair by ED staff.

## 2020-08-01 NOTE — DISCHARGE INSTRUCTIONS
Rest, avoid prolonged sitting. Use Incentive spirometer as directed. Follow up with your primary care for recheck. Return to the Emergency Dept for any worsening pain, shortness of breath, chest pain or fever.

## 2020-08-03 ENCOUNTER — PATIENT OUTREACH (OUTPATIENT)
Dept: CASE MANAGEMENT | Age: 85
End: 2020-08-03

## 2020-08-03 ENCOUNTER — TELEPHONE (OUTPATIENT)
Dept: FAMILY MEDICINE CLINIC | Age: 85
End: 2020-08-03

## 2020-08-03 NOTE — PROGRESS NOTES
Patient contacted regarding recent discharge and COVID-19 risk. Discussed COVID-19 related testing which was not done at this time. Care Transition Nurse/ Ambulatory Care Manager contacted the family by telephone to perform post discharge assessment. Verified name and  with family as identifiers. Patient has following risk factors of: diabetes. CTN/ACM reviewed discharge instructions, medical action plan and red flags related to discharge diagnosis. Reviewed and educated them on any new and changed medications related to discharge diagnosis. Advised obtaining a 90-day supply of all daily and as-needed medications. Advance Care Planning:   Does patient have an Advance Directive: not on file     Education provided regarding infection prevention, and signs and symptoms of COVID-19 and when to seek medical attention with family who verbalized understanding. Discussed exposure protocols and quarantine from [address] you at higher risk for severe illness  and given an opportunity for questions and concerns. The family agrees to contact the COVID-19 hotline [phone] or PCP office for questions related to their healthcare. CTN/ACM provided contact information for future reference. From CDC: Are you at higher risk for severe illness?  Wash your hands often.  Avoid close contact (6 feet, which is about two arm lengths) with people who are sick.  Put distance between yourself and other people if COVID-19 is spreading in your community.  Clean and disinfect frequently touched surfaces.  Avoid all cruise travel and non-essential air travel.  Call your healthcare professional if you have concerns about COVID-19 and your underlying condition or if you are sick.     For more information on steps you can take to protect yourself, see CDC's How to Protect Yourself      Patient/family/caregiver given information for Manohar Perdomo and agrees to enroll no      Plan for follow-up call in 7-14 days based on severity of symptoms and risk factors.

## 2020-08-04 ENCOUNTER — VIRTUAL VISIT (OUTPATIENT)
Dept: FAMILY MEDICINE CLINIC | Age: 85
End: 2020-08-04
Payer: MEDICARE

## 2020-08-04 DIAGNOSIS — S22.42XA CLOSED FRACTURE OF MULTIPLE RIBS OF LEFT SIDE, INITIAL ENCOUNTER: ICD-10-CM

## 2020-08-04 PROCEDURE — G9717 DOC PT DX DEP/BP F/U NT REQ: HCPCS | Performed by: FAMILY MEDICINE

## 2020-08-04 PROCEDURE — 1090F PRES/ABSN URINE INCON ASSESS: CPT | Performed by: FAMILY MEDICINE

## 2020-08-04 PROCEDURE — G8428 CUR MEDS NOT DOCUMENT: HCPCS | Performed by: FAMILY MEDICINE

## 2020-08-04 PROCEDURE — 3288F FALL RISK ASSESSMENT DOCD: CPT | Performed by: FAMILY MEDICINE

## 2020-08-04 PROCEDURE — 1100F PTFALLS ASSESS-DOCD GE2>/YR: CPT | Performed by: FAMILY MEDICINE

## 2020-08-04 PROCEDURE — 99214 OFFICE O/P EST MOD 30 MIN: CPT | Performed by: FAMILY MEDICINE

## 2020-08-04 RX ORDER — TRAMADOL HYDROCHLORIDE 50 MG/1
50 TABLET ORAL
Qty: 28 TAB | Refills: 0 | Status: SHIPPED | OUTPATIENT
Start: 2020-08-04 | End: 2020-08-11

## 2020-08-04 RX ORDER — LIDOCAINE 50 MG/G
PATCH TOPICAL
Qty: 30 EACH | Refills: 0 | Status: SHIPPED | OUTPATIENT
Start: 2020-08-04

## 2020-08-04 NOTE — PROGRESS NOTES
Chief Complaint   Patient presents with    Fall     1. Have you been to the ER, urgent care clinic since your last visit? Hospitalized since your last visit? Yes When: [facility] er     2. Have you seen or consulted any other health care providers outside of the [address] [place] [address] since your last visit? Include any pap smears or colon screening. No    Health maintenance reviewed. Pt informed of health maintenance past due and/or upcoming. Pt verbalized understanding.      Health Maintenance Due   Topic Date Due    Foot Exam Q1  05/18/1940    MICROALBUMIN Q1  05/18/1940    Eye Exam Retinal or Dilated  05/18/1940    Bone Densitometry (Dexa) Screening  05/18/1995    Influenza Age 5 to Adult  08/01/2020

## 2020-08-04 NOTE — PROGRESS NOTES
Chief Complaint   Patient presents with    Fall     Pt was seen via doxy. me video visit. Pt had a fall 3 days ago, fractured her left 8th and 9th ribs. Daughter reports that pt is taking tramadol and using lidocaine patches, is still having pain but tramadol is also making patient feel very sleepy. Pt is using incentive spirometer regularly throughout the day. Pt had fallen coming out of the bathroom. Luba Edmondson is a 80 y.o. female who was seen by synchronous (real-time) audio-video technology on 8/4/2020 for Fall        Assessment & Plan:   Diagnoses and all orders for this visit:    1. Closed fracture of multiple ribs of left side, initial encounter  -     traMADoL (Ultram) 50 mg tablet; Take 1 Tab by mouth every six (6) hours as needed for Pain for up to 7 days. Max Daily Amount: 200 mg.    reviewed use of medication, advised to take as needed, lidocaine patches and incentive spirometer as discussed. Pt has follow up in the office next week  Subjective:       Prior to Admission medications    Medication Sig Start Date End Date Taking? Authorizing Provider   traMADoL (Ultram) 50 mg tablet Take 1 Tab by mouth every six (6) hours as needed for Pain for up to 7 days. Max Daily Amount: 200 mg. 8/4/20 8/11/20 Yes Maverick Wilson MD   lidocaine (LIDODERM) 5 % Apply patch to the affected area for 12 hours a day and remove for 12 hours a day. 8/1/20  Yes AGATA Fulton   brief disposable (Briefs, Adult-Extra Large) misc by Does Not Apply route. 5/12/20  Yes Maverick Wilson MD   verapamil ER (CALAN-SR) 180 mg CR tablet Take 1 Tab by mouth daily (with dinner). 4/23/20  Yes Maverick Wilson MD   losartan (COZAAR) 50 mg tablet Take 1 Tab by mouth every morning. 4/23/20  Yes Maverick Wilson MD   levothyroxine (SYNTHROID) 75 mcg tablet Take 1 Tab by mouth Daily (before breakfast).  4/23/20  Yes Maverick Wilson MD   furosemide (LASIX) 40 mg tablet Take 1 Tab by mouth daily. 4/23/20  Yes Chetna Wilson MD   citalopram (CELEXA) 20 mg tablet Take 1 Tab by mouth daily. 4/23/20  Yes Chetna Wilson MD   alendronate (FOSAMAX) 70 mg tablet Take 1 Tab by mouth every seven (7) days. Every 14 days 4/23/20  Yes Chetna Wilson MD   diclofenac (VOLTAREN) 1 % gel Apply 2 g to affected area four (4) times daily. 1/2/20  Yes Chetna Wilson MD   aspirin delayed-release 81 mg tablet Take  by mouth daily. Yes Provider, Historical   traMADoL (Ultram) 50 mg tablet Take 1 Tab by mouth every six (6) hours as needed for Pain for up to 3 days. Max Daily Amount: 200 mg. 8/1/20 8/4/20  AGATA Paul     Patient Active Problem List   Diagnosis Code    Hypertension I10    Diabetes ([facility] E11.9    Mixed hyperlipidemia E78.2    Hypothyroidism E03.9    Depressive disorder F32.9       Review of Systems   Constitutional: Negative for chills, fever and malaise/fatigue. HENT: Negative for congestion and sore throat. Respiratory: Negative for cough and shortness of breath. Cardiovascular: Positive for chest pain. Negative for palpitations. From rib fracture   Gastrointestinal: Negative for abdominal pain, heartburn, nausea and vomiting. Genitourinary: Negative for dysuria and urgency. Musculoskeletal: Positive for falls, joint pain and myalgias. Neurological: Negative for dizziness, tingling and headaches. All other systems reviewed and are negative. Objective:   No flowsheet data found.      [INSTRUCTIONS:  \"[x]\" Indicates a positive item  \"[]\" Indicates a negative item  -- DELETE ALL ITEMS NOT EXAMINED]    Constitutional: [x] Appears well-developed and well-nourished [x] No apparent distress      [] Abnormal -     Mental status: [] Alert and awake  [x] Oriented to person/place/time [x] Able to follow commands    [x] Abnormal - Drowsy, but easily aroused    Eyes:   EOM    [x]  Normal    [] Abnormal -   Sclera  [x]  Normal    [] Abnormal -          Discharge [x]  None visible   [] Abnormal -     HENT: [x] Normocephalic, atraumatic  [] Abnormal -   [x] Mouth/Throat: Mucous membranes are moist    External Ears [x] Normal  [] Abnormal -    Neck: [x] No visualized mass [] Abnormal -     Pulmonary/Chest: [x] Respiratory effort normal   [x] No visualized signs of difficulty breathing or respiratory distress        [] Abnormal -      Musculoskeletal:   [] Normal gait with no signs of ataxia         [x] Normal range of motion of neck        [x] Abnormal - Sitting in wheelchair, sleeping    Neurological:        [x] No Facial Asymmetry (Cranial nerve 7 motor function) (limited exam due to video visit)          [x] No gaze palsy        [] Abnormal -          Skin:        [x] No significant exanthematous lesions or discoloration noted on facial skin         [] Abnormal -            Psychiatric:       [] Normal Affect [x] Abnormal - Sleepy       [x] No Hallucinations    Other pertinent observable physical exam findings:-        We discussed the expected course, resolution and complications of the diagnosis(es) in detail. Medication risks, benefits, costs, interactions, and alternatives were discussed as indicated. I advised her to contact the office if her condition worsens, changes or fails to improve as anticipated. She expressed understanding with the diagnosis(es) and plan. Yamil Colin, who was evaluated through a patient-initiated, synchronous (real-time) audio-video encounter, and/or her healthcare decision maker, is aware that it is a billable service, with coverage as determined by her insurance carrier. She provided verbal consent to proceed: Yes, and patient identification was verified. It was conducted pursuant to the emergency declaration under the [address] [place] [address] and the [employer] and [employer] General Act.  A caregiver was present when appropriate. Ability to conduct physical exam was limited. I was at home. The patient was at home.       Miguel Salazar MD

## 2020-08-06 DIAGNOSIS — S22.43XD MULTIPLE CLOSED FRACTURES OF RIBS OF BOTH SIDES WITH ROUTINE HEALING, SUBSEQUENT ENCOUNTER: ICD-10-CM

## 2020-08-06 DIAGNOSIS — Z74.09 IMPAIRED MOBILITY: Primary | ICD-10-CM

## 2020-08-06 DIAGNOSIS — S22.42XA CLOSED FRACTURE OF MULTIPLE RIBS OF LEFT SIDE, INITIAL ENCOUNTER: ICD-10-CM

## 2020-08-06 DIAGNOSIS — Z91.81 RISK FOR FALLS: ICD-10-CM

## 2020-08-17 ENCOUNTER — PATIENT OUTREACH (OUTPATIENT)
Dept: CASE MANAGEMENT | Age: 85
End: 2020-08-17

## 2020-08-17 NOTE — PROGRESS NOTES
Patient resolved from Transition of Care episode on 8/17/2020  Discussed COVID-19 related testing which was not done at this time. Patient/family has been provided the following resources and education related to COVID-19:                         Signs, symptoms and red flags related to COVID-19            CDC exposure and quarantine guidelines            Conduit exposure contact - [phone]            Contact for their local Department of Health                 Patient currently reports that the following symptoms have improved:  no new symptoms. No further outreach scheduled with this CTN/ACM/LPN/HC/ MA. Episode of Care resolved. Patient has this CTN/ACM/LPN/HC/MA contact information if future needs arise.

## 2020-11-30 RX ORDER — VERAPAMIL HYDROCHLORIDE 180 MG/1
180 TABLET, EXTENDED RELEASE ORAL
Qty: 90 TAB | Refills: 1 | Status: SHIPPED | OUTPATIENT
Start: 2020-11-30 | End: 2021-01-21 | Stop reason: SDUPTHER

## 2020-11-30 RX ORDER — ASPIRIN 81 MG/1
81 TABLET ORAL DAILY
Qty: 90 TAB | Refills: 1 | Status: SHIPPED | OUTPATIENT
Start: 2020-11-30

## 2020-11-30 RX ORDER — ASPIRIN 81 MG/1
TABLET ORAL DAILY
Status: CANCELLED | OUTPATIENT
Start: 2020-11-30

## 2020-11-30 RX ORDER — LEVOTHYROXINE SODIUM 75 UG/1
75 TABLET ORAL
Qty: 90 TAB | Refills: 1 | Status: SHIPPED | OUTPATIENT
Start: 2020-11-30 | End: 2021-01-21 | Stop reason: SDUPTHER

## 2020-11-30 RX ORDER — ALENDRONATE SODIUM 70 MG/1
70 TABLET ORAL
Qty: 12 TAB | Refills: 1 | Status: SHIPPED | OUTPATIENT
Start: 2020-11-30 | End: 2020-12-04 | Stop reason: SDUPTHER

## 2020-11-30 RX ORDER — FUROSEMIDE 40 MG/1
40 TABLET ORAL DAILY
Qty: 90 TAB | Refills: 1 | Status: SHIPPED | OUTPATIENT
Start: 2020-11-30 | End: 2021-01-21 | Stop reason: SDUPTHER

## 2020-11-30 RX ORDER — CITALOPRAM 20 MG/1
20 TABLET, FILM COATED ORAL DAILY
Qty: 90 TAB | Refills: 1 | Status: SHIPPED | OUTPATIENT
Start: 2020-11-30 | End: 2021-01-21 | Stop reason: SDUPTHER

## 2020-11-30 RX ORDER — LOSARTAN POTASSIUM 50 MG/1
50 TABLET ORAL
Qty: 90 TAB | Refills: 1 | Status: SHIPPED | OUTPATIENT
Start: 2020-11-30 | End: 2021-01-21 | Stop reason: SDUPTHER

## 2020-11-30 NOTE — TELEPHONE ENCOUNTER
Requested Prescriptions     Pending Prescriptions Disp Refills    losartan (COZAAR) 50 mg tablet 90 Tab 1     Sig: Take 1 Tab by mouth every morning.  levothyroxine (SYNTHROID) 75 mcg tablet 90 Tab 1     Sig: Take 1 Tab by mouth Daily (before breakfast).  alendronate (FOSAMAX) 70 mg tablet 12 Tab 1     Sig: Take 1 Tab by mouth every seven (7) days. Every 14 days    citalopram (CELEXA) 20 mg tablet 90 Tab 1     Sig: Take 1 Tab by mouth daily.  furosemide (LASIX) 40 mg tablet 90 Tab 1     Sig: Take 1 Tab by mouth daily.  verapamil ER (CALAN-SR) 180 mg CR tablet 90 Tab 1     Sig: Take 1 Tab by mouth daily (with dinner).

## 2020-12-03 ENCOUNTER — TELEPHONE (OUTPATIENT)
Dept: FAMILY MEDICINE CLINIC | Age: 85
End: 2020-12-03

## 2020-12-03 NOTE — TELEPHONE ENCOUNTER
From ShanikoAdela natarajanilanamilena DRAPER  [facility] Number:  [phone] (Call me)               General Message/Vendor Calls     Caller's first and last name:Mary from [facility]       Reason for call:med clarification       Callback required yes/no and why:yes to discuss med instructions       Best contact number(s):[phone]; reference #[id number]       Details to clarify the request:alendronate (FOSAMAX) 70 mg tablet instructions       Darren Obrien

## 2020-12-04 RX ORDER — ALENDRONATE SODIUM 70 MG/1
70 TABLET ORAL
Qty: 12 TAB | Refills: 1 | Status: SHIPPED | OUTPATIENT
Start: 2020-12-04 | End: 2021-10-18 | Stop reason: SDUPTHER

## 2021-01-11 ENCOUNTER — TELEPHONE (OUTPATIENT)
Dept: FAMILY MEDICINE CLINIC | Age: 86
End: 2021-01-11

## 2021-01-11 NOTE — TELEPHONE ENCOUNTER
Velvet Zamudio the daughter is calling stating they need the hospital bed for pt she also wants a bed side commode if it is free wants you to check on that

## 2021-01-11 NOTE — TELEPHONE ENCOUNTER
Please advise that appt is needed before supplies can be ordered, need documentation for insurance purposes. Can be virtual, but needs to be video.

## 2021-01-14 ENCOUNTER — VIRTUAL VISIT (OUTPATIENT)
Dept: FAMILY MEDICINE CLINIC | Age: 86
End: 2021-01-14

## 2021-01-20 ENCOUNTER — TELEPHONE (OUTPATIENT)
Dept: FAMILY MEDICINE CLINIC | Age: 86
End: 2021-01-20

## 2021-01-20 NOTE — TELEPHONE ENCOUNTER
PT has a VV tomorrow. Meghan Ceja (daughter) is letting you know that she requests PureWick, medication for her appetite, and skin breakdown on her back.

## 2021-01-21 ENCOUNTER — VIRTUAL VISIT (OUTPATIENT)
Dept: FAMILY MEDICINE CLINIC | Age: 86
End: 2021-01-21
Payer: MEDICARE

## 2021-01-21 DIAGNOSIS — R39.81 FUNCTIONAL INCONTINENCE: Primary | ICD-10-CM

## 2021-01-21 DIAGNOSIS — F32.A DEPRESSIVE DISORDER: ICD-10-CM

## 2021-01-21 DIAGNOSIS — E78.2 MIXED HYPERLIPIDEMIA: ICD-10-CM

## 2021-01-21 DIAGNOSIS — I10 ESSENTIAL HYPERTENSION: ICD-10-CM

## 2021-01-21 DIAGNOSIS — E03.9 ACQUIRED HYPOTHYROIDISM: ICD-10-CM

## 2021-01-21 DIAGNOSIS — E11.9 TYPE 2 DIABETES MELLITUS WITHOUT COMPLICATION, UNSPECIFIED WHETHER LONG TERM INSULIN USE (HCC): ICD-10-CM

## 2021-01-21 PROCEDURE — 1100F PTFALLS ASSESS-DOCD GE2>/YR: CPT | Performed by: FAMILY MEDICINE

## 2021-01-21 PROCEDURE — 3288F FALL RISK ASSESSMENT DOCD: CPT | Performed by: FAMILY MEDICINE

## 2021-01-21 PROCEDURE — 1090F PRES/ABSN URINE INCON ASSESS: CPT | Performed by: FAMILY MEDICINE

## 2021-01-21 PROCEDURE — G8428 CUR MEDS NOT DOCUMENT: HCPCS | Performed by: FAMILY MEDICINE

## 2021-01-21 PROCEDURE — 99214 OFFICE O/P EST MOD 30 MIN: CPT | Performed by: FAMILY MEDICINE

## 2021-01-21 PROCEDURE — G0463 HOSPITAL OUTPT CLINIC VISIT: HCPCS | Performed by: FAMILY MEDICINE

## 2021-01-21 PROCEDURE — G9717 DOC PT DX DEP/BP F/U NT REQ: HCPCS | Performed by: FAMILY MEDICINE

## 2021-01-21 RX ORDER — VERAPAMIL HYDROCHLORIDE 180 MG/1
180 TABLET, EXTENDED RELEASE ORAL
Qty: 90 TAB | Refills: 3 | Status: SHIPPED | OUTPATIENT
Start: 2021-01-21 | End: 2021-10-12 | Stop reason: SDUPTHER

## 2021-01-21 RX ORDER — FUROSEMIDE 40 MG/1
40 TABLET ORAL DAILY
Qty: 90 TAB | Refills: 3 | Status: SHIPPED | OUTPATIENT
Start: 2021-01-21 | End: 2021-10-12 | Stop reason: SDUPTHER

## 2021-01-21 RX ORDER — LEVOTHYROXINE SODIUM 75 UG/1
75 TABLET ORAL
Qty: 90 TAB | Refills: 3 | Status: SHIPPED | OUTPATIENT
Start: 2021-01-21 | End: 2021-06-23 | Stop reason: DRUGHIGH

## 2021-01-21 RX ORDER — CITALOPRAM 20 MG/1
20 TABLET, FILM COATED ORAL DAILY
Qty: 90 TAB | Refills: 3 | Status: SHIPPED | OUTPATIENT
Start: 2021-01-21 | End: 2021-10-12 | Stop reason: SDUPTHER

## 2021-01-21 RX ORDER — LOSARTAN POTASSIUM 50 MG/1
50 TABLET ORAL
Qty: 90 TAB | Refills: 3 | Status: SHIPPED | OUTPATIENT
Start: 2021-01-21 | End: 2021-10-12 | Stop reason: SDUPTHER

## 2021-01-21 RX ORDER — LORATADINE 5 MG/5ML
SOLUTION ORAL
Qty: 3 PACKAGE | Refills: 11 | Status: SHIPPED | OUTPATIENT
Start: 2021-01-21

## 2021-01-21 NOTE — PROGRESS NOTES
1. Have you been to the ER, urgent care clinic since your last visit? Hospitalized since your last visit? Yes. Unable to recall    2. Have you seen or consulted any other health care providers outside of the [facility] since your last visit? Include any pap smears or colon screening.  No     Health Maintenance Due   Topic Date Due    Foot Exam Q1  05/18/1940    MICROALBUMIN Q1  05/18/1940    Eye Exam Retinal or Dilated  05/18/1940    COVID-19 Vaccine (1 of 2) [birth year]/1946    DTaP/Tdap/Td series (1 - Tdap) 05/18/1951    Shingrix Vaccine Age 50> (1 of 2) 05/18/1980    Bone Densitometry (Dexa) Screening  05/18/1995    Flu Vaccine (1) 09/01/2020    Medicare Yearly Exam  09/26/2020

## 2021-01-21 NOTE — PROGRESS NOTES
Chief Complaint   Patient presents with    Decreased Appetite     MEDICATION TO HELP WITH APPETITE STIMULATION    Incontinence     REQUESTING [address]    Skin Problem     BELOW RIGHT BUTTOCKS     Pt was seen via virtual video visit. Pt reports that she has been sick and weak, has been having swelling in her feet. Very low appetite. Daughter is concerned that she is depressed, pt states that she is getting ready to \"go home\"    Pt called her grandchildren a few days ago, thought she was going to die. Pt alternated between saying she did not have any problems or concerns , and saying she had a lot of problems. She did not want to speak in front of her daughter. Daughter then walked away from patient and stated that she is getting more confused, talks to the TV, thinks that people on TV can see her. Doreen Mckeon is a 80 y.o. female who was seen by synchronous (real-time) audio-video technology on 1/21/2021 for Decreased Appetite (MEDICATION TO HELP WITH APPETITE STIMULATION), Incontinence (REQUESTING [facility]), and Skin Problem (BELOW RIGHT BUTTOCKS)        Assessment & Plan:   Diagnoses and all orders for this visit:    1. Functional incontinence  -     REFERRAL TO HOME HEALTH  -     brief disposable (Briefs, Adult-Extra Large) misc; by Does Not Apply route. Please fax to medical supply    2. Essential hypertension  -     REFERRAL TO HOME HEALTH  -     losartan (COZAAR) 50 mg tablet; Take 1 Tab by mouth every morning.  -     furosemide (LASIX) 40 mg tablet; Take 1 Tab by mouth daily. -     verapamil ER (CALAN-SR) 180 mg CR tablet; Take 1 Tab by mouth daily (with dinner). 3. Acquired hypothyroidism  -     REFERRAL TO HOME HEALTH  -     levothyroxine (SYNTHROID) 75 mcg tablet; Take 1 Tab by mouth Daily (before breakfast). 4. Type 2 diabetes mellitus without complication, unspecified whether long term insulin use ([facility]  -     [address] [place] [address]    5.  Mixed hyperlipidemia  - REFERRAL TO HOME HEALTH    6. Depressive disorder  -     REFERRAL TO HOME HEALTH  -     citalopram (CELEXA) 20 mg tablet; Take 1 Tab by mouth daily. [facility], unclear what are patients exact needs. Subjective:       Prior to Admission medications    Medication Sig Start Date End Date Taking? Authorizing Provider   losartan (COZAAR) 50 mg tablet Take 1 Tab by mouth every morning. 1/21/21  Yes Aubrey Wilson MD   levothyroxine (SYNTHROID) 75 mcg tablet Take 1 Tab by mouth Daily (before breakfast). 1/21/21  Yes Aubrey Wilson MD   citalopram (CELEXA) 20 mg tablet Take 1 Tab by mouth daily. 1/21/21  Yes Aubrey Wilson MD   furosemide (LASIX) 40 mg tablet Take 1 Tab by mouth daily. 1/21/21  Yes Aubrey Wilson MD   verapamil ER (CALAN-SR) 180 mg CR tablet Take 1 Tab by mouth daily (with dinner). 1/21/21  Yes Aubrey Wilson MD   brief disposable (Briefs, Adult-Extra Large) misc by Does Not Apply route. Please fax to medical supply 1/21/21  Yes Aubrey Wilson MD   alendronate (FOSAMAX) 70 mg tablet Take 1 Tab by mouth every seven (7) days. 12/4/20  Yes Aubrey Wilson MD   aspirin delayed-release 81 mg tablet Take 1 Tab by mouth daily. 11/30/20  Yes Aubrey Wilson MD   [address] [place] [address] bed with side rails 8/6/20  Yes Lilian Li NP   OTHER Bedside commode 8/6/20  Yes Lilian Li NP   lidocaine (LIDODERM) 5 % Apply patch to the affected area for 12 hours a day and remove for 12 hours a day. 8/4/20  Yes Aubrey Wilson MD   diclofenac (VOLTAREN) 1 % gel Apply 2 g to affected area four (4) times daily. 1/2/20  Yes Aubrey Wilson MD   losartan (COZAAR) 50 mg tablet Take 1 Tab by mouth every morning. 11/30/20 1/21/21  Aubrey Wilson MD   levothyroxine (SYNTHROID) 75 mcg tablet Take 1 Tab by mouth Daily (before breakfast).  11/30/20 1/21/21  Aubrey Wilson MD citalopram (CELEXA) 20 mg tablet Take 1 Tab by mouth daily. 11/30/20 1/21/21  Marciano Wilson MD   furosemide (LASIX) 40 mg tablet Take 1 Tab by mouth daily. 11/30/20 1/21/21  Marciano Wilson MD   verapamil ER (CALAN-SR) 180 mg CR tablet Take 1 Tab by mouth daily (with dinner). 11/30/20 1/21/21  Marciano Wilson MD   brief disposable (Briefs, Adult-Extra Large) misc by Does Not Apply route. 5/12/20 1/21/21  Marciano Wilson MD     Patient Active Problem List   Diagnosis Code    Hypertension I10    Diabetes ([facility] E11.9    Mixed hyperlipidemia E78.2    Hypothyroidism E03.9    Depressive disorder F32.9     Current Outpatient Medications   Medication Sig Dispense Refill    losartan (COZAAR) 50 mg tablet Take 1 Tab by mouth every morning. 90 Tab 3    levothyroxine (SYNTHROID) 75 mcg tablet Take 1 Tab by mouth Daily (before breakfast). 90 Tab 3    citalopram (CELEXA) 20 mg tablet Take 1 Tab by mouth daily. 90 Tab 3    furosemide (LASIX) 40 mg tablet Take 1 Tab by mouth daily. 90 Tab 3    verapamil ER (CALAN-SR) 180 mg CR tablet Take 1 Tab by mouth daily (with dinner). 90 Tab 3    brief disposable (Briefs, Adult-Extra Large) misc by Does Not Apply route. Please fax to medical supply 3 Package 11    alendronate (FOSAMAX) 70 mg tablet Take 1 Tab by mouth every seven (7) days. 12 Tab 1    aspirin delayed-release 81 mg tablet Take 1 Tab by mouth daily. [address] bed with side rails 1 Each 0    OTHER Bedside commode 1 Each 0    lidocaine (LIDODERM) 5 % Apply patch to the affected area for 12 hours a day and remove for 12 hours a day. 30 Each 0    diclofenac (VOLTAREN) 1 % gel Apply 2 g to affected area four (4) times daily. 100 g 3     Allergies   Allergen Reactions    Ace Inhibitors Cough    Penicillins Rash       Review of Systems   Constitutional: Positive for malaise/fatigue. Negative for chills and fever.    HENT: Negative for congestion and sore throat. Respiratory: Negative for cough and shortness of breath. Cardiovascular: Negative for chest pain and palpitations. Gastrointestinal: Negative for abdominal pain, heartburn, nausea and vomiting. Genitourinary: Negative for dysuria and urgency. Musculoskeletal: Negative for joint pain and myalgias. Neurological: Positive for weakness. Negative for dizziness, tingling and headaches. Psychiatric/Behavioral: Positive for depression. All other systems reviewed and are negative. Objective:   No flowsheet data found.      [INSTRUCTIONS:  \"[x]\" Indicates a positive item  \"[]\" Indicates a negative item  -- DELETE ALL ITEMS NOT EXAMINED]    Constitutional: [x] Appears well-developed and well-nourished [x] No apparent distress      [] Abnormal -     Mental status: [x] Alert and awake  [x] Oriented to person/place/time [x] Able to follow commands    [] Abnormal -     Eyes:   EOM    [x]  Normal    [] Abnormal -   Sclera  [x]  Normal    [] Abnormal -          Discharge [x]  None visible   [] Abnormal -     HENT: [x] Normocephalic, atraumatic  [] Abnormal -   [x] Mouth/Throat: Mucous membranes are moist    External Ears [x] Normal  [] Abnormal -    Neck: [x] No visualized mass [] Abnormal -     Pulmonary/Chest: [x] Respiratory effort normal   [x] No visualized signs of difficulty breathing or respiratory distress        [] Abnormal -      Musculoskeletal:   [x] Normal gait with no signs of ataxia         [x] Normal range of motion of neck        [] Abnormal -     Neurological:        [x] No Facial Asymmetry (Cranial nerve 7 motor function) (limited exam due to video visit)          [x] No gaze palsy        [] Abnormal -          Skin:        [x] No significant exanthematous lesions or discoloration noted on facial skin         [] Abnormal -            Psychiatric:       [x] Normal Affect [] Abnormal -        [x] No Hallucinations    Other pertinent observable physical exam findings:-        We discussed the expected course, resolution and complications of the diagnosis(es) in detail. Medication risks, benefits, costs, interactions, and alternatives were discussed as indicated. I advised her to contact the office if her condition worsens, changes or fails to improve as anticipated. She expressed understanding with the diagnosis(es) and plan. Windy Garg, who was evaluated through a patient-initiated, synchronous (real-time) audio-video encounter, and/or her healthcare decision maker, is aware that it is a billable service, with coverage as determined by her insurance carrier. She provided verbal consent to proceed: Yes, and patient identification was verified. It was conducted pursuant to the emergency declaration under the [address] [place] [address] authority and the [employer] General Act. A caregiver was present when appropriate. Ability to conduct physical exam was limited. I was in the office. The patient was at home.       Sussy Estes MD

## 2021-01-22 ENCOUNTER — TELEPHONE (OUTPATIENT)
Dept: FAMILY MEDICINE CLINIC | Age: 86
End: 2021-01-22

## 2021-02-01 ENCOUNTER — TELEPHONE (OUTPATIENT)
Dept: FAMILY MEDICINE CLINIC | Age: 86
End: 2021-02-01

## 2021-02-01 NOTE — TELEPHONE ENCOUNTER
Bronson Viramontes from [facility] says that patient is refusing any and all home health.

## 2021-02-22 ENCOUNTER — TELEPHONE (OUTPATIENT)
Dept: FAMILY MEDICINE CLINIC | Age: 86
End: 2021-02-22

## 2021-02-22 NOTE — TELEPHONE ENCOUNTER
Pt's daughter is trying to get meds refilled. There are multiple levels of confusion between the two sisters that are taking care of the mother. Daughter states that Dr. Sammi Lopez was supposed to send in a script for some sort of sleeping pill. Also that pt needs all her meds refilled but could not give me the names of meds needed. Stated that they are all in her chart. I explained there are multiple medications on the list to choose from but they could not give me any names. Also there is something going on with the pt owing $400 to the mail order pharmacy and that the pharmacy will not fill any scripts until balance is paid. Daughter claims that they should not have to pay because they have never had to pay that much before. Please call daughter Radha Baker for clarification.  [phone]

## 2021-03-04 ENCOUNTER — VIRTUAL VISIT (OUTPATIENT)
Dept: FAMILY MEDICINE CLINIC | Age: 86
End: 2021-03-04
Payer: MEDICARE

## 2021-03-04 DIAGNOSIS — R26.9 GAIT ABNORMALITY: ICD-10-CM

## 2021-03-04 DIAGNOSIS — E03.9 ACQUIRED HYPOTHYROIDISM: ICD-10-CM

## 2021-03-04 DIAGNOSIS — R53.81 DEBILITY: ICD-10-CM

## 2021-03-04 DIAGNOSIS — E11.9 TYPE 2 DIABETES MELLITUS WITHOUT COMPLICATION, UNSPECIFIED WHETHER LONG TERM INSULIN USE (HCC): Primary | ICD-10-CM

## 2021-03-04 DIAGNOSIS — R13.10 SWALLOWING PROBLEM: ICD-10-CM

## 2021-03-04 DIAGNOSIS — F32.A DEPRESSIVE DISORDER: ICD-10-CM

## 2021-03-04 DIAGNOSIS — G89.29 CHRONIC ANKLE PAIN, UNSPECIFIED LATERALITY: ICD-10-CM

## 2021-03-04 DIAGNOSIS — M25.579 CHRONIC ANKLE PAIN, UNSPECIFIED LATERALITY: ICD-10-CM

## 2021-03-04 DIAGNOSIS — I10 ESSENTIAL HYPERTENSION: ICD-10-CM

## 2021-03-04 DIAGNOSIS — E78.2 MIXED HYPERLIPIDEMIA: ICD-10-CM

## 2021-03-04 PROCEDURE — 99443 PR PHYS/QHP TELEPHONE EVALUATION 21-30 MIN: CPT | Performed by: FAMILY MEDICINE

## 2021-03-04 RX ORDER — CALCIUM CARBONATE 750 MG/1
2 TABLET, CHEWABLE ORAL DAILY
Qty: 180 TAB | Refills: 3 | Status: SHIPPED | OUTPATIENT
Start: 2021-03-04

## 2021-03-04 RX ORDER — ACETAMINOPHEN 160 MG/1
320 BAR, CHEWABLE ORAL
Qty: 180 TAB | Refills: 3 | Status: SHIPPED | OUTPATIENT
Start: 2021-03-04

## 2021-03-04 NOTE — PROGRESS NOTES
Ale Trejo is a 80 y.o. female evaluated via audio only technology on 3/4/2021. Consent: She and/or her health care decision maker is aware that she may receive a bill for this audio only encounter, depending on her insurance coverage, and has provided verbal consent to proceed: Yes    I communicated with the patient and/or health care decision maker about the nature and details of the following:  Assessment & Plan:     Urinary frequency  Not an acute issue  Unclear why she is on Lasix  Cut pill in half for the next week and then stop to see if this helps    Osteoporosis  Fosamax  Switch over to Tums, take 2/day    Ankle pain  2 children's Tylenol chewables will be about the same as the dose of Tylenol she is found effective for her ankle pain    Depression  Is taking citalopram.  I think it will likely that she has dementia with a depressive bent. I do not think that a dose adjustment or change would necessarily be helpful. Family can choose to see psychiatry if needed but I suggest they continue her current medicine    She has a balance problem and pain with walking. I think she would benefit from physical therapy in the home to help with pain and help her be more mobile    She has a complex medical situation and is homebound. I would like to ask home health nurse to do a needs assessment to see if there is anything else we can do for her. While nurse is in there there can draw routine labs    12  Subjective:   Ale Trejo is a 80 y.o. female who was seen for Leg Pain, Difficulty Walking, and Urinary Hesitancy      This is my first time speaking with patient. Her history is rambling, her thoughts perseverate on death and passing on as well as nobody caring about her or caring for her. Top of her mind is leg pain. I believe she is referring to her ankle.   She did not feel like she could walk yesterday evening even to the bathroom until she found a 250 mg Tylenol pill and took it and then she felt like she could walk around no problem. She also feels that she urinates too much. I see that she is taking Lasix but we are unsure why. Eventually patient handed off the phone to her daughter. They live together. Daughter never leaves the house for fear of leaving mother alone. We discussed patient's concern of urinating too much. They are open to the idea of stopping Lasix. She is having trouble swallowing a large Tylenol pill and are interested in a chewable version. The other pill she is having trouble with is her calcium pill which she needs since does not have a lot of calcium in her diet. She would be able to handle a chewable version. It is well over a year since she has had a last labs. These were done at [facility]. They cannot leave the house to go to a lab's are requesting home health nurse    Prior to Admission medications    Medication Sig Start Date End Date Taking? Authorizing Provider   calcium carbonate (TUMS EX) 300 mg (750 mg) chewable tablet Take 2 Tabs by mouth daily. Indications: osteoporosis, a condition of weak bones 3/4/21  Yes Tushar Green MD   acetaminophen (TYLENOL) 160 mg chew Take 2 Tabs by mouth every four (4) hours as needed for Pain. 3/4/21  Yes Tushar Green MD   losartan (COZAAR) 50 mg tablet Take 1 Tab by mouth every morning. 1/21/21  Yes Concha Wilson MD   levothyroxine (SYNTHROID) 75 mcg tablet Take 1 Tab by mouth Daily (before breakfast). 1/21/21  Yes Concha Wilson MD   citalopram (CELEXA) 20 mg tablet Take 1 Tab by mouth daily. 1/21/21  Yes Concha Wilson MD   furosemide (LASIX) 40 mg tablet Take 1 Tab by mouth daily. 1/21/21  Yes Concha Wilson MD   verapamil ER (CALAN-SR) 180 mg CR tablet Take 1 Tab by mouth daily (with dinner). 1/21/21  Yes Concha Wilson MD   alendronate (FOSAMAX) 70 mg tablet Take 1 Tab by mouth every seven (7) days.  12/4/20  Yes Klever Wilson Yesica Hankins MD   brief disposable (Briefs, Adult-Extra Large) misc by Does Not Apply route. Please fax to medical supply 1/21/21   Candace Wilson MD   aspirin delayed-release 81 mg tablet Take 1 Tab by mouth daily. 11/30/20   Candace Wilson MD   [address] [place] [address] bed with side rails 8/6/20   Lilian Li NP   OTHER Bedside commode 8/6/20   Lilian Li NP   lidocaine (LIDODERM) 5 % Apply patch to the affected area for 12 hours a day and remove for 12 hours a day. 8/4/20   Candace Wilson MD   diclofenac (VOLTAREN) 1 % gel Apply 2 g to affected area four (4) times daily. 1/2/20   Candace Wilson MD     Allergies   Allergen Reactions    Ace Inhibitors Cough    Penicillins Rash         I affirm this is a Patient-Initiated Episode with a Patient who has not had a related appointment within my department in the past 7 days or scheduled within the next 24 hours.     Total Time: minutes: 21-30 minutes    Note: not billable if this call serves to triage the patient into an appointment for the relevant concern      Frankie Felty, MD

## 2021-03-04 NOTE — PROGRESS NOTES
Chief Complaint   Patient presents with    Leg Pain    Difficulty Walking    Urinary Hesitancy     Health Maintenance reviewed     1. Have you been to the ER, urgent care clinic since your last visit? Hospitalized since your last visit? No     2. Have you seen or consulted any other health care providers outside of the [address] [place] [address] since your last visit? Include any pap smears or colon screening.   No

## 2021-03-04 NOTE — Clinical Note
Placed a home health referral.  I believe she had WAUP[facility] KASSIE DEXTER, recently. She had declined services at the time but is open to them now.   Please ask home health nurse to obtain routine blood work which I have ordered

## 2021-03-11 ENCOUNTER — TELEPHONE (OUTPATIENT)
Dept: FAMILY MEDICINE CLINIC | Age: 86
End: 2021-03-11

## 2021-03-11 ENCOUNTER — VIRTUAL VISIT (OUTPATIENT)
Dept: FAMILY MEDICINE CLINIC | Age: 86
End: 2021-03-11
Payer: MEDICARE

## 2021-03-11 ENCOUNTER — PATIENT OUTREACH (OUTPATIENT)
Dept: CASE MANAGEMENT | Age: 86
End: 2021-03-11

## 2021-03-11 DIAGNOSIS — R26.9 GAIT ABNORMALITY: ICD-10-CM

## 2021-03-11 DIAGNOSIS — F03.92 DEMENTIA WITH PSYCHOSIS: ICD-10-CM

## 2021-03-11 DIAGNOSIS — E03.9 ACQUIRED HYPOTHYROIDISM: ICD-10-CM

## 2021-03-11 DIAGNOSIS — D64.9 ANEMIA, UNSPECIFIED TYPE: ICD-10-CM

## 2021-03-11 DIAGNOSIS — E11.9 TYPE 2 DIABETES MELLITUS WITHOUT COMPLICATION, UNSPECIFIED WHETHER LONG TERM INSULIN USE (HCC): Primary | ICD-10-CM

## 2021-03-11 DIAGNOSIS — R39.81 FUNCTIONAL URINARY INCONTINENCE: ICD-10-CM

## 2021-03-11 PROCEDURE — G8536 NO DOC ELDER MAL SCRN: HCPCS | Performed by: FAMILY MEDICINE

## 2021-03-11 PROCEDURE — G9717 DOC PT DX DEP/BP F/U NT REQ: HCPCS | Performed by: FAMILY MEDICINE

## 2021-03-11 PROCEDURE — 1090F PRES/ABSN URINE INCON ASSESS: CPT | Performed by: FAMILY MEDICINE

## 2021-03-11 PROCEDURE — 99215 OFFICE O/P EST HI 40 MIN: CPT | Performed by: FAMILY MEDICINE

## 2021-03-11 PROCEDURE — 3288F FALL RISK ASSESSMENT DOCD: CPT | Performed by: FAMILY MEDICINE

## 2021-03-11 PROCEDURE — G0463 HOSPITAL OUTPT CLINIC VISIT: HCPCS | Performed by: FAMILY MEDICINE

## 2021-03-11 PROCEDURE — G8427 DOCREV CUR MEDS BY ELIG CLIN: HCPCS | Performed by: FAMILY MEDICINE

## 2021-03-11 PROCEDURE — G8417 CALC BMI ABV UP PARAM F/U: HCPCS | Performed by: FAMILY MEDICINE

## 2021-03-11 PROCEDURE — 1100F PTFALLS ASSESS-DOCD GE2>/YR: CPT | Performed by: FAMILY MEDICINE

## 2021-03-11 NOTE — TELEPHONE ENCOUNTER
Referred for home health. Looks like [facility] seen her.   Please send consult to them

## 2021-03-11 NOTE — PROGRESS NOTES
1. Have you been to the ER, urgent care clinic since your last visit? Hospitalized since your last visit? Yes. [facility] ED    2. Have you seen or consulted any other health care providers outside of the [address] [place] [address] since your last visit? Include any pap smears or colon screening.  No     Health Maintenance Due   Topic Date Due    Foot Exam Q1  Never done    MICROALBUMIN Q1  Never done    Eye Exam Retinal or Dilated  Never done    COVID-19 Vaccine (1 of 2) Never done    DTaP/Tdap/Td series (1 - Tdap) Never done    Shingrix Vaccine Age 50> (1 of 2) Never done    Bone Densitometry (Dexa) Screening  Never done    Flu Vaccine (1) 09/01/2020    Medicare Yearly Exam  09/26/2020

## 2021-03-11 NOTE — LETTER
Ms. Venkatesh Wong [address] [address] Social Work Note 
 
 
 
Date/Time:  3/12/2021 1:51 PM 
 
 
My name is BRANDIE Cerda.  I am a  with [address] [place] [address]. I often work with patients who could benefit from additional resources in the community. We are committed to providing you excellent care. I was asked to reach out to you by Mary Ugarte MD to assist with arranging services within the home setting. If you would like additional help with community resources. Please call BRANDIE Bullard at [phone]. We appreciate the confidence you've shown by selecting us to provide your healthcare needs and I look forward to hearing from you soon. Sincerely, 
 
 
[address] [facility] Management Team 
[address] [place] [address] C: [phone] [email or web address]

## 2021-03-11 NOTE — PROGRESS NOTES
THIS VISIT WAS COMPLETED VIRTUALLY USING DOXY.ME    MICHAEL  Elsa Padilla is a 89 y.o. female who presents for altered mental status and ED follow-up.    Seen today with 2 of her daughters.  I met her for the first time last week.  Evidently a few days after that visit she had a complaint of chest pain and shortness of breath so daughters called EMS.  Emergency room note is not completed but discharge instructions indicate that she was started on Zyprexa for behavior issues.  Unclear if she has started this medicine.    I spoke with patient for a few minutes.  She is a little more organized and less depressive this morning.  Is not complaining of leg pain.  Feels that she can get up and walk to the bathroom comfortably.  She is not saying anything negative about her family.  She is not talking about death or dying.    Spoke with 2 of her daughters who live with her.  They say that she has good days and bad days and that this morning is not necessarily a sign of longer-term improvement.     They say she has not been herself for a few years.  They say for the last 6 months she has been \"talking out of her head\" and more recently she has been seeing people that are not there and having conversations with them.  These can be quite dramatic including an episode earlier this week where she threatened to use a gun on a hallucination (did not actually have a gun).  Of note daughters mentioned that there might be guns in the house but they are not sure where they are    One of the complaints daughters have is her tendency to call a family members at all times the day and night and her daughters and other family members do not care for her and they are ungrateful and why bother having children in the first place.  This is in keeping with the flow of conversation I had with her last week and I see a documented elsewhere in the medical record.  Evidently there is a new APS case that daughters think a family member called on them.   They dispute any kind of neglect. They do however think that they need some help because they are becoming physically incapable of fully caring for her. For example she needs quite a lot of assistance getting to the bathroom and needed quite a lot of assistance coming in from the car after her emergency room visit. Daughters are still sore from this. PMHx:  Past Medical History:   Diagnosis Date    Anxiety     Diabetes ([address]     Hypertension        Meds:   Current Outpatient Medications   Medication Sig Dispense Refill    calcium carbonate (TUMS EX) 300 mg (750 mg) chewable tablet Take 2 Tabs by mouth daily. Indications: osteoporosis, a condition of weak bones 180 Tab 3    acetaminophen (TYLENOL) 160 mg chew Take 2 Tabs by mouth every four (4) hours as needed for Pain. 180 Tab 3    losartan (COZAAR) 50 mg tablet Take 1 Tab by mouth every morning. 90 Tab 3    levothyroxine (SYNTHROID) 75 mcg tablet Take 1 Tab by mouth Daily (before breakfast). 90 Tab 3    citalopram (CELEXA) 20 mg tablet Take 1 Tab by mouth daily. 90 Tab 3    furosemide (LASIX) 40 mg tablet Take 1 Tab by mouth daily. 90 Tab 3    verapamil ER (CALAN-SR) 180 mg CR tablet Take 1 Tab by mouth daily (with dinner). 90 Tab 3    brief disposable (Briefs, Adult-Extra Large) misc by Does Not Apply route. Please fax to medical supply 3 Package 11    alendronate (FOSAMAX) 70 mg tablet Take 1 Tab by mouth every seven (7) days. (Patient taking differently: Take 35 mg by mouth every seven (7) days.) 12 Tab 1   [facility] bed with side rails 1 Each 0    OTHER Bedside commode 1 Each 0    diclofenac (VOLTAREN) 1 % gel Apply 2 g to affected area four (4) times daily. 100 g 3    aspirin delayed-release 81 mg tablet Take 1 Tab by mouth daily. 90 Tab 1    lidocaine (LIDODERM) 5 % Apply patch to the affected area for 12 hours a day and remove for 12 hours a day. 30 Each 0       Allergies:    Allergies   Allergen Reactions    Ace Inhibitors Cough    Penicillins Rash       Smoker:  Social History     Tobacco Use   Smoking Status Never Smoker   Smokeless Tobacco Never Used       ETOH:   Social History     Substance and Sexual Activity   Alcohol Use No       FH:   Family History   Problem Relation Age of Onset    Hypertension Mother     Hypertension Father     Cancer Father     Cancer Sister     Cancer Brother        ROS:   As listed in HPI. In addition:  Constitutional:   No headache, fever, fatigue, weight loss or weight gain      Cardiac:    No chest pain      Resp:   No cough or shortness of breath      Neuro   No loss of consciousness, dizziness, seizures      Physical Exam:  There were no vitals taken for this visit. GEN: Lying in bed. Pleasant. Unable to tell me about her recent emergency room visit. Due to this being a TeleHealth evaluation, many elements of the physical examination are unable to be assessed. Assessment and Plan     I think it likely that she has a dementia. 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 Has not picked it up yet. Try this and report on response in 1-2 weeks  Agree with neurology consult for complex case  I would like to do metabolic work-up if practical    Hypothyroid  TSH 6 emergency room. Probably because she is not taking her Synthroid every day    EVAN  Creatinine 1.3, GFR 38, BUN 48, BUN/creatinine 36. Appear to be dehydrated. Is now drinking  4 bottles of water a day    Anemia  Hemoglobin 8.2  Unclear when this happened. Last CBC done at former PCP was 11 a year ago    Challenges:  Refuses her medicine 50% of the time  Refuses to get out of bed-even when assistance is available  Requires assistance with walking-cannot get out of bed on her own  Incontinent of urine-frequent bedwetting  House is wheelchair accessible.   Seems to use a cane at times    Directed to [employer] for resources  We will consult social worker  Refer for home health skilled nurse, physical therapy, home health aide    Urinary incontinence  She would benefit from a bed incontinence product i.e. pure wick to prevent moisture buildup and skin breakdown. She has longstanding urinary incontinence, this is unlikely to improve. Length of need will be lifetime. If she chooses to get a pure wick she will need to change the wick once a day    I would like to plan to get labs in 1-2 weeks. CBC CMP B12 vitamin D ferritin      ICD-10-CM ICD-9-CM    1. Type 2 diabetes mellitus without complication, unspecified whether long term insulin use (HCC)  E11.9 250.00 [facility]   2. Dementia with psychosis ([facility]  F03.91 294.8 REFERRAL TO HOME HEALTH      VITAMIN B12 & FOLATE      VITAMIN D, 25 HYDROXY      VITAMIN B12 & FOLATE      VITAMIN D, 25 HYDROXY   3. Gait abnormality  R26.9 781.2 REFERRAL TO HOME HEALTH   4. Anemia, unspecified type  D64.9 285.9 FERRITIN      IRON PROFILE      FERRITIN      IRON PROFILE   5. Acquired hypothyroidism  E03.9 244.9    6. Functional urinary incontinence  R39.81 788.91      This was a 40-minute visit. Greater than 50% of the time was spent counseling the patient on dementia with psychosis. Pursuant to the emergency declaration under the [facility], [facility] waiver authority and the [employer] and [employer] Act, this Virtual  Visit was conducted, with patient's consent, to reduce the patient's risk of exposure to COVID-19 and provide continuity of care for an established patient. Services were provided through a video synchronous discussion virtually to substitute for in-person clinic visit.

## 2021-03-12 NOTE — PROGRESS NOTES
Social Work Note      Received referral from Michael Cook MD (PCP) requesting outreach to patient for assistance in the home including [address]. SW called patient and family (her daughter, Sukhjinder Heard) to initiate services. Both patient and daughter refused to verify two identifies (name and ). Stating that they were skeptical of my true identify. SW provided full name, practice location and referral source. Family continue to decline disclosing any information. Family requested  ID number.  plan to mail out official letter with contact information and Release of Information/HIPAA form to patient's physical address. Family is agreeable. Once letter received, Family will follow-up with SW for assistance. No further needs identified at this time. No further outreached scheduled at this time. Sent status update to PCP via In Basket. [facility] Care Coordination  with Official Letter and MARCIA/HIPAA to mail out to patient's residence. Ms. Kaycee Law  [address] [address]    Social Work Note        Date/Time:  3/12/2021 1:51 PM      My name is BRANDIE Khan.  I am a  with [address] [place] [address]. I often work with patients who could benefit from additional resources in the community. We are committed to providing you excellent care. I was asked to reach out to you by Michael Cook MD to assist with arranging services within the home setting. If you would like additional help with community resources. Please call BRANDIE Milan at [phone]. We appreciate the confidence you've shown by selecting us to provide your healthcare needs and I look forward to hearing from you soon.      Sincerely,      Janee Babinski [facility] Management Team  [address] [place] [address], Aminata Fournier [address] [place] [address]  C: [phone] [email or web address]        Damon Chaudhry MSW  Care Transitions Team    [facility] Care CoordinationTeam  [phone]

## 2021-06-04 ENCOUNTER — TELEPHONE (OUTPATIENT)
Dept: FAMILY MEDICINE CLINIC | Age: 86
End: 2021-06-04

## 2021-06-04 NOTE — TELEPHONE ENCOUNTER
----- Message from Lisseth Bob sent at 6/4/2021  1:50 PM EDT -----  Regarding: /telephone  Contact: [phone]  General Message/Vendor Calls    Caller's first and last name: Shivam Lackey with [address]      Reason for call: She is requesting an update on the female external catheter order and the turnaround time.        Callback required yes/no and why: Yes      Best contact number(s): [phone]      Details to clarify the request: N/A      Lisseth Bob

## 2021-06-07 ENCOUNTER — DOCUMENTATION ONLY (OUTPATIENT)
Dept: FAMILY MEDICINE CLINIC | Age: 86
End: 2021-06-07

## 2021-06-07 NOTE — TELEPHONE ENCOUNTER
There have been a few forms from the pure wick catheter people.   Most recent one was faxed off on Friday

## 2021-06-08 ENCOUNTER — TELEPHONE (OUTPATIENT)
Dept: FAMILY MEDICINE CLINIC | Age: 86
End: 2021-06-08

## 2021-06-08 ENCOUNTER — VIRTUAL VISIT (OUTPATIENT)
Dept: FAMILY MEDICINE CLINIC | Age: 86
End: 2021-06-08
Payer: MEDICARE

## 2021-06-08 DIAGNOSIS — R39.81 FUNCTIONAL URINARY INCONTINENCE: ICD-10-CM

## 2021-06-08 DIAGNOSIS — E03.9 ACQUIRED HYPOTHYROIDISM: ICD-10-CM

## 2021-06-08 DIAGNOSIS — D64.9 ANEMIA, UNSPECIFIED TYPE: ICD-10-CM

## 2021-06-08 DIAGNOSIS — M79.604 PAIN IN BOTH LOWER EXTREMITIES: ICD-10-CM

## 2021-06-08 DIAGNOSIS — E78.2 MIXED HYPERLIPIDEMIA: ICD-10-CM

## 2021-06-08 DIAGNOSIS — M79.605 PAIN IN BOTH LOWER EXTREMITIES: ICD-10-CM

## 2021-06-08 DIAGNOSIS — E53.8 B12 DEFICIENCY: ICD-10-CM
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
## 2021-06-08 DIAGNOSIS — M81.0 OSTEOPOROSIS, UNSPECIFIED OSTEOPOROSIS TYPE, UNSPECIFIED PATHOLOGICAL FRACTURE PRESENCE: ICD-10-CM

## 2021-06-08 DIAGNOSIS — R63.8 INADEQUATE ORAL INTAKE: ICD-10-CM

## 2021-06-08 DIAGNOSIS — L89.159 PRESSURE INJURY OF SKIN OF SACRAL REGION, UNSPECIFIED INJURY STAGE: ICD-10-CM

## 2021-06-08 DIAGNOSIS — M79.18 BUTTOCK PAIN: ICD-10-CM

## 2021-06-08 DIAGNOSIS — F03.92 DEMENTIA WITH PSYCHOSIS: Primary | ICD-10-CM

## 2021-06-08 DIAGNOSIS — I10 ESSENTIAL HYPERTENSION: ICD-10-CM

## 2021-06-08 DIAGNOSIS — R53.81 DEBILITY: ICD-10-CM

## 2021-06-08 DIAGNOSIS — R26.9 GAIT ABNORMALITY: ICD-10-CM

## 2021-06-08 DIAGNOSIS — E11.9 TYPE 2 DIABETES MELLITUS WITHOUT COMPLICATION, UNSPECIFIED WHETHER LONG TERM INSULIN USE (HCC): ICD-10-CM

## 2021-06-08 PROCEDURE — G8427 DOCREV CUR MEDS BY ELIG CLIN: HCPCS | Performed by: FAMILY MEDICINE

## 2021-06-08 PROCEDURE — 99215 OFFICE O/P EST HI 40 MIN: CPT | Performed by: FAMILY MEDICINE

## 2021-06-08 PROCEDURE — 1101F PT FALLS ASSESS-DOCD LE1/YR: CPT | Performed by: FAMILY MEDICINE

## 2021-06-08 PROCEDURE — G0463 HOSPITAL OUTPT CLINIC VISIT: HCPCS | Performed by: FAMILY MEDICINE

## 2021-06-08 PROCEDURE — G9717 DOC PT DX DEP/BP F/U NT REQ: HCPCS | Performed by: FAMILY MEDICINE

## 2021-06-08 PROCEDURE — 1090F PRES/ABSN URINE INCON ASSESS: CPT | Performed by: FAMILY MEDICINE

## 2021-06-08 RX ORDER — OLANZAPINE 2.5 MG/1
2.5 TABLET ORAL
Qty: 90 TABLET | Refills: 0 | Status: SHIPPED | OUTPATIENT
Start: 2021-06-08

## 2021-06-08 RX ORDER — OLANZAPINE 5 MG/1
TABLET ORAL
COMMUNITY
Start: 2021-03-09 | End: 2021-03-24

## 2021-06-08 NOTE — PROGRESS NOTES
1. Have you been to the ER, urgent care clinic since your last visit? Hospitalized since your last visit? No    2. Have you seen or consulted any other health care providers outside of the [address] [place] [address] since your last visit? Include any pap smears or colon screening.  No     Health Maintenance Due   Topic Date Due    Foot Exam Q1  Never done    MICROALBUMIN Q1  Never done    Eye Exam Retinal or Dilated  Never done    COVID-19 Vaccine (1) Never done    DTaP/Tdap/Td series (1 - Tdap) Never done    Shingrix Vaccine Age 50> (1 of 2) Never done    Bone Densitometry (Dexa) Screening  Never done    Medicare Yearly Exam  09/26/2020

## 2021-06-08 NOTE — Clinical Note
There are a couple orders this encounter the need to be faxed off Hospital bed order to [facility], Chux pads, ensure to home care delivered Home health to [facility]

## 2021-06-08 NOTE — PROGRESS NOTES
THIS VISIT WAS COMPLETED VIRTUALLY USING DOXY. ALVIN RODRIGUEZ  Jayro Grey is a 80 y.o. female who presents for dementia, pressure ulcer, incontinence. Patient was not very clear mentally today. Was answering questions obliquely. Not a good historian for recent events. History primarily obtained from 2 of her daughters    She fell on Sunday. Evidently tried to get out of bed on her own and ended up on the floor. Ambulance had to be called to help get her back into bed. There was no obvious injury at the time but the next day she stated that she could not stand up and reported to family that \"I hurt all over\" and could not be more specific. She has been drinking her usual amount of fluids. She has been eating her usual amount which family feels is not very much. She has been drinking 23 Ensure per day and 12 bottles of water    Daughter's noticed 2 pressure sores. 1 with skin breakdown about the size of a nickel on the left buttock and one with macerated skin in the gluteal fold. Patient is incontinent and is wearing depends. These are changed about every 2 hours. She is also lying on Chux pads in bed. She has impaired mobility at baseline, it is difficult to get her to the bathroom. I had ordered home health in March. Family had not heard from agency. I have filled out paperwork for a pure wick catheter. Patient has not heard about that.     I had ordered a social work consult but family refused to speak with them, sounds like they thought it was a scam      PMHx:  Past Medical History:   Diagnosis Date    Anxiety     Diabetes ([facility]     Hypertension        Meds:   Current Outpatient Medications   Medication Sig Dispense Refill    OTHER Semielectric hospital bed with side rails  Length of need 99 monthslifetime 1 Each 0    OTHER Adult diapers 10/day  Chux pads 5/day  Ensure nutritional supplement 3 bottles per day 1 Each 0    calcium carbonate (TUMS EX) 300 mg (750 mg) chewable tablet Take 2 Tabs by mouth daily. Indications: osteoporosis, a condition of weak bones 180 Tab 3    acetaminophen (TYLENOL) 160 mg chew Take 2 Tabs by mouth every four (4) hours as needed for Pain. 180 Tab 3    losartan (COZAAR) 50 mg tablet Take 1 Tab by mouth every morning. 90 Tab 3    levothyroxine (SYNTHROID) 75 mcg tablet Take 1 Tab by mouth Daily (before breakfast). 90 Tab 3    citalopram (CELEXA) 20 mg tablet Take 1 Tab by mouth daily. 90 Tab 3    furosemide (LASIX) 40 mg tablet Take 1 Tab by mouth daily. 90 Tab 3    verapamil ER (CALAN-SR) 180 mg CR tablet Take 1 Tab by mouth daily (with dinner). 90 Tab 3    brief disposable (Briefs, Adult-Extra Large) misc by Does Not Apply route. Please fax to medical supply 3 Package 11    alendronate (FOSAMAX) 70 mg tablet Take 1 Tab by mouth every seven (7) days. (Patient taking differently: Take 35 mg by mouth every seven (7) days.) 12 Tab 1    aspirin delayed-release 81 mg tablet Take 1 Tab by mouth daily. 90 Tab 1    OTHER Bedside commode 1 Each 0    lidocaine (LIDODERM) 5 % Apply patch to the affected area for 12 hours a day and remove for 12 hours a day. 30 Each 0    diclofenac (VOLTAREN) 1 % gel Apply 2 g to affected area four (4) times daily. 100 g 3       Allergies: Allergies   Allergen Reactions    Ace Inhibitors Cough    Penicillins Rash       Smoker:  Social History     Tobacco Use   Smoking Status Never Smoker   Smokeless Tobacco Never Used       ETOH:   Social History     Substance and Sexual Activity   Alcohol Use No       FH:   Family History   Problem Relation Age of Onset    Hypertension Mother     Hypertension Father     Cancer Father     Cancer Sister     Cancer Brother        ROS:   As listed in HPI.  In addition:  Constitutional:   No headache, fever, fatigue, weight loss or weight gain      Cardiac:    No chest pain      Resp:   No cough or shortness of breath      Neuro   No loss of consciousness, dizziness, seizures      Physical Exam:  There were no vitals taken for this visit. GEN: No apparent distress. Alert and oriented and responds to all questions appropriately. NEUROLOGIC:  No focal neurologic deficits. Coordination and gait grossly intact. EXT: Well perfused. No edema. SKIN: No obvious rashes. Due to this being a TeleHealth evaluation, many elements of the physical examination are unable to be assessed. Assessment and Plan     Dementia  Psychotic features  Combative with family members  Trial of olanzapine. This was prescribed by emergency room in March. Not clear if they actually pick this up. Is not currently taking it  Think a neurology consult would be helpful. Family defers for now    Pressure ulcer, weakness, gait instability, falls  Home health skilled nurse physical therapy occupational therapy, home health aide, social work consult. We will ask [place] first    We will ask home health to obtain labs following up on emergency room labs 3/2021    Hypothyroid  TSH 6 in emergency room, probably because she is not taking Synthroid every day     EVAN  Creatinine 1.3, GFR 38, BUN 48, BUN/creatinine 36. Appear to be dehydrated. Is now drinking  4 bottles of water a day     Anemia  Hemoglobin 8.2  Unclear when this happened. Last CBC done at former PCP was 11 a year ago    Urinary incontinence, bedwetting  She is currently wearing depends at all times, changing every 2 hours to help with skin breakdown  Chux pads are protecting bed  She would benefit from a pure wick external catheter to prevent moisture buildup and skin breakdown. She has longstanding urinary incontinence. It is unlikely to improve. Length of need will be lifetime.   If she chooses to get a pure catheter she will need to change the wick once a day     She is confined to her bed majority of the time  She has skin breakdown from pressure sores and requires frequent repositioning  She requires frequent repositioning i.e. elevation of head and legs for comfort due to pain in her legs and buttock  She requires positioning in a way not feasible with an ordinary bed  She would benefit from a semielectric hospital bed  She would benefit from siderails to help prevent falling from bed    Nutrition supplementation  She is eating 1 small meal per day. Says she does not have much appetite. Requires nutritional supplementation with Ensure 3 bottles per day     Challenges  Refuses her medicine 50% of the time  Refuses to get out of bedeven when assistance is available  Requires assistance with walkingcannot get out of bed on her own  Incontinent of urinefrequent bedwetting  House is not wheelchair accessible. Seems to use a cane at times    Ensure, depends, Chux pads are too expensive for family. We will see if her insurance can cover      This was a 40-minute visit. Greater than 50% of the time was spent counseling the patient on dementia, falls, pressure ulcer. ICD-10-CM ICD-9-CM    1. Dementia with psychosis ([facility]  F03.91 294.8 REFERRAL TO HOME HEALTH   2. Gait abnormality  R26.9 781.2 OTHER      REFERRAL TO HOME HEALTH   3. Anemia, unspecified type  D64.9 285.9 FERRITIN      IRON PROFILE   4. Acquired hypothyroidism  E03.9 244.9 TSH 3RD GENERATION   5. Functional urinary incontinence  R39.81 788.91 OTHER   6. Mixed hyperlipidemia  E78.2 272.2 LIPID PANEL   7. Debility  R53.81 799.3 REFERRAL TO HOME HEALTH   8. Pressure injury of skin of sacral region, unspecified injury stage  L89.159 707.03 OTHER     707.20 REFERRAL TO HOME HEALTH   9. Fall, initial encounter  [address] XXXA E888.9 OTHER      REFERRAL TO HOME HEALTH   10. Pain in both lower extremities  M79.604 729.5 OTHER    M79.605     11. Buttock pain  M79.18 729.1 OTHER   12. Inadequate oral intake  R63.8 783.9 OTHER      REFERRAL TO HOME HEALTH   13. Type 2 diabetes mellitus without complication, unspecified whether long term insulin use (HCC)  E11.9 250.00 HEMOGLOBIN A1C WITH EAG   14. Essential hypertension  I10 401.9 CBC WITH AUTOMATED DIFF      METABOLIC PANEL, COMPREHENSIVE   15. Osteoporosis, unspecified osteoporosis type, unspecified pathological fracture presence  M81.0 733.00 VITAMIN D, 25 HYDROXY   16. B12 deficiency  E53.8 266.2 VITAMIN B12 & FOLATE         Pursuant to the emergency declaration under the [address] [place] [address], [facility] waiver authority and the [employer] and [employer] Act, this Virtual  Visit was conducted, with patient's consent, to reduce the patient's risk of exposure to COVID-19 and provide continuity of care for an established patient. Services were provided through a video synchronous discussion virtually to substitute for in-person clinic visit.

## 2021-06-09 ENCOUNTER — TELEPHONE (OUTPATIENT)
Dept: FAMILY MEDICINE CLINIC | Age: 86
End: 2021-06-09

## 2021-06-09 NOTE — TELEPHONE ENCOUNTER
----- Message from Sydni Christian sent at 6/8/2021  4:56 PM EDT -----  Regarding: Dr. Alexandria Lemos  General Message/Vendor Calls    Caller's first and last name: Evert Claros from [facility]. Reason for call: Patient is discharging from hospital and they were sent home health orders. Needs to know if Dr. Nilsa Fernandes will follow patient for home health services.        Callback required yes/no and why: yes, to discuss       Best contact number(s): [phone]      Details to clarify the request: N/A      Sydni Christian

## 2021-06-09 NOTE — TELEPHONE ENCOUNTER
Notified that one of the patient's daughters chased the home health nurse away when arrived to do blood

## 2021-06-09 NOTE — TELEPHONE ENCOUNTER
----- Message from Hari Becerra sent at 6/9/2021 10:05 AM EDT -----  Regarding: Dr Wilson/telephone  General Message/Vendor Calls    Caller's first and last name:Nurse Con Nguyen of [facility]      Reason for call:Lb orders      Callback required yes/no and why: yes, if necessary      Best contact number(s): [phone]      Details to clarify the request:Nurse Olive Draper is requesting lab orders to be faxed to the home office at  [phone]      Hari Becerra

## 2021-06-09 NOTE — TELEPHONE ENCOUNTER
Pt daughter called stating that Dr. Carlo Morrison needs to talk to the home healthcare nurse about blood samples that need to be drawn. Please call [phone]    States to tell Dr. Carlo Morrison it's an emergency.

## 2021-06-09 NOTE — TELEPHONE ENCOUNTER
Daughter Minor Phelps forgot to ask about prescriptions while talking with Dr. Dilshad Correa [facility] earlier. States she needs something to help her mom sleep. Also needs some sort of pain medicine. Can we prescribe? Please send to [employer] mail [facility].

## 2021-06-10 ENCOUNTER — APPOINTMENT (OUTPATIENT)
Dept: CT IMAGING | Age: 86
End: 2021-06-10
Attending: EMERGENCY MEDICINE
Payer: MEDICARE

## 2021-06-10 ENCOUNTER — APPOINTMENT (OUTPATIENT)
Dept: GENERAL RADIOLOGY | Age: 86
End: 2021-06-10
Attending: EMERGENCY MEDICINE
Payer: MEDICARE

## 2021-06-10 ENCOUNTER — HOSPITAL ENCOUNTER (EMERGENCY)
Age: 86
Discharge: HOME | End: 2021-06-10
Attending: EMERGENCY MEDICINE
Payer: MEDICARE

## 2021-06-10 VITALS
DIASTOLIC BLOOD PRESSURE: 54 MMHG | HEART RATE: 65 BPM | TEMPERATURE: 98.4 F | SYSTOLIC BLOOD PRESSURE: 140 MMHG | WEIGHT: 182.1 LBS | OXYGEN SATURATION: 98 % | RESPIRATION RATE: 22 BRPM | HEIGHT: 64 IN | BODY MASS INDEX: 31.09 KG/M2

## 2021-06-10 DIAGNOSIS — F03.90 DEMENTIA WITHOUT BEHAVIORAL DISTURBANCE, UNSPECIFIED DEMENTIA TYPE: ICD-10-CM

## 2021-06-10 DIAGNOSIS — E86.0 DEHYDRATION: ICD-10-CM

## 2021-06-10 DIAGNOSIS — M79.10 MYALGIA: ICD-10-CM

## 2021-06-10 DIAGNOSIS — N30.00 ACUTE CYSTITIS WITHOUT HEMATURIA: Primary | ICD-10-CM

## 2021-06-10 DIAGNOSIS — R29.6 RECURRENT FALLS WHILE WALKING: ICD-10-CM

## 2021-06-10 LAB
ALBUMIN SERPL-MCNC: 3.2 G/DL (ref 3.5–5)
ALBUMIN/GLOB SERPL: 0.6 {RATIO} (ref 1.1–2.2)
ALP SERPL-CCNC: 55 U/L (ref 45–117)
ALT SERPL-CCNC: 17 U/L (ref 12–78)
ANION GAP SERPL CALC-SCNC: 9 MMOL/L (ref 5–15)
APPEARANCE UR: ABNORMAL
AST SERPL-CCNC: 17 U/L (ref 15–37)
BACTERIA URNS QL MICRO: ABNORMAL /HPF
BASOPHILS # BLD: 0 K/UL (ref 0–0.1)
BASOPHILS NFR BLD: 0 % (ref 0–1)
BILIRUB SERPL-MCNC: 0.8 MG/DL (ref 0.2–1)
BILIRUB UR QL: NEGATIVE
BUN SERPL-MCNC: 26 MG/DL (ref 6–20)
BUN/CREAT SERPL: 16 (ref 12–20)
CALCIUM SERPL-MCNC: 9.5 MG/DL (ref 8.5–10.1)
CHLORIDE SERPL-SCNC: 100 MMOL/L (ref 97–108)
CO2 SERPL-SCNC: 27 MMOL/L (ref 21–32)
COLOR UR: ABNORMAL
CREAT SERPL-MCNC: 1.58 MG/DL (ref 0.55–1.02)
DIFFERENTIAL METHOD BLD: ABNORMAL
EOSINOPHIL # BLD: 0 K/UL (ref 0–0.4)
EOSINOPHIL NFR BLD: 0 % (ref 0–7)
EPITH CASTS URNS QL MICRO: ABNORMAL /LPF
ERYTHROCYTE [DISTWIDTH] IN BLOOD BY AUTOMATED COUNT: 13.2 % (ref 11.5–14.5)
GLOBULIN SER CALC-MCNC: 5.2 G/DL (ref 2–4)
GLUCOSE SERPL-MCNC: 145 MG/DL (ref 65–100)
GLUCOSE UR STRIP.AUTO-MCNC: NEGATIVE MG/DL
HCT VFR BLD AUTO: 33.5 % (ref 35–47)
HGB BLD-MCNC: 11.4 G/DL (ref 11.5–16)
HGB UR QL STRIP: ABNORMAL
HYALINE CASTS URNS QL MICRO: ABNORMAL /LPF (ref 0–5)
IMM GRANULOCYTES # BLD AUTO: 0 K/UL (ref 0–0.04)
IMM GRANULOCYTES NFR BLD AUTO: 0 % (ref 0–0.5)
KETONES UR QL STRIP.AUTO: NEGATIVE MG/DL
LEUKOCYTE ESTERASE UR QL STRIP.AUTO: ABNORMAL
LYMPHOCYTES # BLD: 1.9 K/UL (ref 0.8–3.5)
LYMPHOCYTES NFR BLD: 20 % (ref 12–49)
MCH RBC QN AUTO: 29.7 PG (ref 26–34)
MCHC RBC AUTO-ENTMCNC: 34 G/DL (ref 30–36.5)
MCV RBC AUTO: 87.2 FL (ref 80–99)
MONOCYTES # BLD: 0.7 K/UL (ref 0–1)
MONOCYTES NFR BLD: 7 % (ref 5–13)
NEUTS SEG # BLD: 6.7 K/UL (ref 1.8–8)
NEUTS SEG NFR BLD: 73 % (ref 32–75)
NITRITE UR QL STRIP.AUTO: NEGATIVE
NRBC # BLD: 0 K/UL (ref 0–0.01)
NRBC BLD-RTO: 0 PER 100 WBC
PH UR STRIP: 6 [PH] (ref 5–8)
PLATELET # BLD AUTO: 273 K/UL (ref 150–400)
PMV BLD AUTO: 10 FL (ref 8.9–12.9)
POTASSIUM SERPL-SCNC: 3.6 MMOL/L (ref 3.5–5.1)
PROT SERPL-MCNC: 8.4 G/DL (ref 6.4–8.2)
PROT UR STRIP-MCNC: NEGATIVE MG/DL
RBC # BLD AUTO: 3.84 M/UL (ref 3.8–5.2)
RBC #/AREA URNS HPF: ABNORMAL /HPF (ref 0–5)
SODIUM SERPL-SCNC: 136 MMOL/L (ref 136–145)
SP GR UR REFRACTOMETRY: 1.01 (ref 1–1.03)
UA: UC IF INDICATED,UAUC: ABNORMAL
UROBILINOGEN UR QL STRIP.AUTO: 1 EU/DL (ref 0.2–1)
WBC # BLD AUTO: 9.2 K/UL (ref 3.6–11)
WBC URNS QL MICRO: >100 /HPF (ref 0–4)

## 2021-06-10 PROCEDURE — 70450 CT HEAD/BRAIN W/O DYE: CPT

## 2021-06-10 PROCEDURE — 72125 CT NECK SPINE W/O DYE: CPT

## 2021-06-10 PROCEDURE — 74011250636 HC RX REV CODE- 250/636: Performed by: EMERGENCY MEDICINE

## 2021-06-10 PROCEDURE — 87086 URINE CULTURE/COLONY COUNT: CPT

## 2021-06-10 PROCEDURE — 96360 HYDRATION IV INFUSION INIT: CPT

## 2021-06-10 PROCEDURE — 73502 X-RAY EXAM HIP UNI 2-3 VIEWS: CPT

## 2021-06-10 PROCEDURE — 87186 SC STD MICRODIL/AGAR DIL: CPT

## 2021-06-10 PROCEDURE — 80053 COMPREHEN METABOLIC PANEL: CPT

## 2021-06-10 PROCEDURE — 99285 EMERGENCY DEPT VISIT HI MDM: CPT

## 2021-06-10 PROCEDURE — 36415 COLL VENOUS BLD VENIPUNCTURE: CPT

## 2021-06-10 PROCEDURE — 81001 URINALYSIS AUTO W/SCOPE: CPT

## 2021-06-10 PROCEDURE — 73552 X-RAY EXAM OF FEMUR 2/>: CPT

## 2021-06-10 PROCEDURE — 71045 X-RAY EXAM CHEST 1 VIEW: CPT

## 2021-06-10 PROCEDURE — 87077 CULTURE AEROBIC IDENTIFY: CPT

## 2021-06-10 PROCEDURE — 74011250637 HC RX REV CODE- 250/637: Performed by: EMERGENCY MEDICINE

## 2021-06-10 PROCEDURE — 85025 COMPLETE CBC W/AUTO DIFF WBC: CPT

## 2021-06-10 RX ORDER — CEPHALEXIN 500 MG/1
500 CAPSULE ORAL 4 TIMES DAILY
Qty: 28 CAPSULE | Refills: 0 | Status: SHIPPED | OUTPATIENT
Start: 2021-06-10 | End: 2021-06-17

## 2021-06-10 RX ORDER — CEPHALEXIN 250 MG/1
500 CAPSULE ORAL
Status: COMPLETED | OUTPATIENT
Start: 2021-06-10 | End: 2021-06-10

## 2021-06-10 RX ADMIN — SODIUM CHLORIDE 1000 ML: 9 INJECTION, SOLUTION INTRAVENOUS at 06:08

## 2021-06-10 RX ADMIN — CEPHALEXIN 500 MG: 250 CAPSULE ORAL at 06:53

## 2021-06-10 NOTE — DISCHARGE INSTRUCTIONS
It was a pleasure taking care of you in our Emergency Department today. We know that when you come to [facility], you are entrusting us with your health, comfort, and safety. Our physicians and nurses honor that trust, and truly appreciate the opportunity to care for you and your loved ones. We also value your feedback. If you receive a survey about your Emergency Department experience today, please fill it out. We care about our patients' feedback, and we listen to what you have to say. Thank you!       Dr. Jermaine Borrero MD.

## 2021-06-10 NOTE — ED NOTES
TRANSFER - IN REPORT:    Verbal and bedside report received from [facility] (name) on [facility] and the [facility]. Report consisted of patients Situation, Background, Assessment and   Recommendations(SBAR). Information from the following report(s) SBAR and ED Summary was reviewed with the receiving nurse. Opportunity for questions and clarification was provided. 18: Spoke with pt family member and pt will need [facility] transport home. 46: Spoke with pt daughter Abiodun Hardy and verified pt address. 0820: ETA for [facility] is 0945.     [id number]: Pt placed on a bed pan at this time. 1009: Spoke with pt daughter and notified her that [facility] was here to take pt home. Pt unable to sign papers due to dementia but notified pt daughter Abiodun Hardy of discharge instructions and that pt has an antibiotic for .     1030: Pt has been discharged with [facility].

## 2021-06-11 ENCOUNTER — PATIENT OUTREACH (OUTPATIENT)
Dept: CASE MANAGEMENT | Age: 86
End: 2021-06-11

## 2021-06-11 NOTE — PROGRESS NOTES
ACM spoke with Solomon Maxwell is listed as medical agent. Educated patient about risk for severe COVID-19 due to risk factors according to CDC guidelines. ACM reviewed discharge instructions, medical action plan and red flag symptoms with the caregiver, Mauricio Osborne  who verbalized understanding. Ms Lissa Lynne stated she has not been able to  antibiotic cephalexin at this time but plans to obtain today. Ms Lissa Lynne reports will follow up with Dr Madi Anne as needed. Discussed COVID vaccination status: yes. Patient has refused to be vaccinated per daughter report. Education provided on COVID-19 vaccination as appropriate. Discussed exposure protocols and quarantine with CDC Guidelines. Caregiver was given an opportunity to verbalize any questions and concerns and agrees to contact ACM or health care provider for questions related to their healthcare.  JOCELYNB

## 2021-06-11 NOTE — TELEPHONE ENCOUNTER
These are big questions and should have been brought up during visit. The olanzapine is a new medicine we are trying, it should help with sleep.     Opiate pain medication is not a good idea  She cannot have NSAIDs based on her kidney function from blood work that was obtained in the emergency room    Tylenol for pain    Home health physical therapy will help with pain

## 2021-06-12 LAB
BACTERIA SPEC CULT: ABNORMAL
CC UR VC: ABNORMAL
SERVICE CMNT-IMP: ABNORMAL

## 2021-06-14 NOTE — TELEPHONE ENCOUNTER
Mya Blanchard notified and voiced understanding.  Mya Blanchard is requesting an order for a wheelchair for her Mom

## 2021-06-15 ENCOUNTER — TELEPHONE (OUTPATIENT)
Dept: FAMILY MEDICINE CLINIC | Age: 86
End: 2021-06-15

## 2021-06-15 NOTE — TELEPHONE ENCOUNTER
Pt's daughter called. Mother was yelling in the background. They do not want a nurse coming out today. I informed them we do not have the information about who was supposed to come out. Daughter said to have Dr rojo contact whoever he approved.

## 2021-06-15 NOTE — TELEPHONE ENCOUNTER
Referral, demographics, insurance, and progress note faxed to [phone] and confirmation page recv'd. Incontinent supplies faxed to [facility] @ [phone] and confirmation page recv'd.

## 2021-06-23 ENCOUNTER — TELEPHONE (OUTPATIENT)
Dept: FAMILY MEDICINE CLINIC | Age: 86
End: 2021-06-23

## 2021-06-23 DIAGNOSIS — E03.9 ACQUIRED HYPOTHYROIDISM: ICD-10-CM

## 2021-06-23 RX ORDER — LEVOTHYROXINE SODIUM 88 UG/1
88 TABLET ORAL
Qty: 90 TABLET | Refills: 3 | Status: SHIPPED | OUTPATIENT
Start: 2021-06-23 | End: 2021-10-18 | Stop reason: SDUPTHER

## 2021-06-23 NOTE — TELEPHONE ENCOUNTER
Received labs done by [facility] dated 6/22. Vitamin D is normal.      TSH is 7.75. Change Synthroid dose to 88 mcg. I have already called that in    Hemoglobin A1c 5.9% is prediabetes. Kidney function is stable.   GFR is 44 which appears to be her baseline

## 2021-06-23 NOTE — TELEPHONE ENCOUNTER
Daughter Manju Kellogg notified and voiced understanding.  She also requested for rx to go to the [facility] in [facility]

## 2021-06-28 ENCOUNTER — TELEPHONE (OUTPATIENT)
Dept: FAMILY MEDICINE CLINIC | Age: 86
End: 2021-06-28

## 2021-06-28 DIAGNOSIS — R26.2 INABILITY TO WALK: ICD-10-CM

## 2021-06-28 DIAGNOSIS — F03.92 DEMENTIA WITH PSYCHOSIS: Primary | ICD-10-CM

## 2021-06-28 NOTE — TELEPHONE ENCOUNTER
Placed a hospice referral.  This will need to be faxed.   She may qualify because of her dementia and her inability to walk without assistance

## 2021-06-28 NOTE — TELEPHONE ENCOUNTER
Raquel Medley is calling requesting a Hospice referral faxed over for pt she has spoke with Hanna Reyes she states she had requested this last Wednesday this needs to be faxed to [phone]  She can be reached at [phone]

## 2021-06-30 ENCOUNTER — DOCUMENTATION ONLY (OUTPATIENT)
Dept: FAMILY MEDICINE CLINIC | Age: 86
End: 2021-06-30

## 2021-07-12 ENCOUNTER — TELEPHONE (OUTPATIENT)
Dept: FAMILY MEDICINE CLINIC | Age: 86
End: 2021-07-12

## 2021-07-12 DIAGNOSIS — F02.81 ALZHEIMER'S DEMENTIA WITH BEHAVIORAL DISTURBANCE, UNSPECIFIED TIMING OF DEMENTIA ONSET: Primary | ICD-10-CM

## 2021-07-12 DIAGNOSIS — G30.9 ALZHEIMER'S DEMENTIA WITH BEHAVIORAL DISTURBANCE, UNSPECIFIED TIMING OF DEMENTIA ONSET: Primary | ICD-10-CM

## 2021-07-12 NOTE — TELEPHONE ENCOUNTER
Chandler calling from hospice to speak to a nurse. Says on the referral that dementia is not a qualifying reason for hospice. She was seeing if there was another code we could use.  Please call her at [phone] on her cell, or at the office [phone]

## 2021-07-12 NOTE — TELEPHONE ENCOUNTER
Dementia is the only diagnosis we will be able to give. She may qualify on the basis of her dementia because of her inability to walk without assistance.   This places her in FAST category stage 7c

## 2021-08-03 ENCOUNTER — TELEPHONE (OUTPATIENT)
Dept: FAMILY MEDICINE CLINIC | Age: 86
End: 2021-08-03

## 2021-08-03 NOTE — TELEPHONE ENCOUNTER
Cam with [facility] called regarding patient. She is having questions regarding patients medication.  Is patient still taking lasix and if so what dosage    Please give Cam a call back  BCB#  [phone]

## 2021-09-29 ENCOUNTER — TELEPHONE (OUTPATIENT)
Dept: FAMILY MEDICINE CLINIC | Age: 86
End: 2021-09-29

## 2021-09-29 NOTE — TELEPHONE ENCOUNTER
----- Message from Erlin Sutton sent at 9/29/2021 12:27 PM EDT -----  Regarding: Dr. Elisa Franks RN/telephone  General Message/Vendor Calls    Caller's first and last name:Corbin Wu      Reason for call:Needs to speak with Dr. Elisa aldana. Callback required yes/no and why:yes, Needs to speak with Dr. Elisa aldana. Best contact number(s):[phone]      Details to clarify the request:Needs to speak with Dr. Elisa aldana.       Erlin Sutton

## 2021-09-29 NOTE — TELEPHONE ENCOUNTER
Per Dr. Hima Salas, verbal Shahbaz Glassing given for placement into home health and d/c hospice. She also wanted to let you know that pt has a wound to her right heel which was scraped on a wheelchair. Will send treatment plan to the office.

## 2021-10-12 ENCOUNTER — TELEPHONE (OUTPATIENT)
Dept: FAMILY MEDICINE CLINIC | Age: 86
End: 2021-10-12

## 2021-10-12 DIAGNOSIS — F32.A DEPRESSIVE DISORDER: ICD-10-CM

## 2021-10-12 DIAGNOSIS — I10 ESSENTIAL HYPERTENSION: ICD-10-CM

## 2021-10-12 RX ORDER — CITALOPRAM 20 MG/1
20 TABLET, FILM COATED ORAL DAILY
Qty: 90 TABLET | Refills: 3 | Status: SHIPPED | OUTPATIENT
Start: 2021-10-12 | End: 2021-12-13

## 2021-10-12 RX ORDER — FUROSEMIDE 40 MG/1
40 TABLET ORAL DAILY
Qty: 90 TABLET | Refills: 3 | Status: SHIPPED | OUTPATIENT
Start: 2021-10-12

## 2021-10-12 RX ORDER — LOSARTAN POTASSIUM 50 MG/1
50 TABLET ORAL
Qty: 90 TABLET | Refills: 3 | Status: SHIPPED | OUTPATIENT
Start: 2021-10-12

## 2021-10-12 RX ORDER — VERAPAMIL HYDROCHLORIDE 180 MG/1
180 TABLET, EXTENDED RELEASE ORAL
Qty: 90 TABLET | Refills: 3 | Status: SHIPPED | OUTPATIENT
Start: 2021-10-12 | End: 2021-12-13

## 2021-10-12 NOTE — TELEPHONE ENCOUNTER
Message from [employer]    Dr. Kaylie Beltran  Received: Today  [address] Office Pool  Medication Refill     Caller (if not patient):Sheryl [facility])       Relationship of caller (if not patient):       Best contact number(s):[phone]       Name of medication and dosage if known:\"Haloperidol\" 2 mg \"Citalopram\" ,\"Losartan\", \"Senokot\", Verapamil\" 180 mg, \"Furosemide\" 40 mg, \"Banophen\" 25 mg 1 at bedtime, \"       Is patient out of this medication (yes/no):       Pharmacy name:[facility]     Pharmacy listed in chart? (yes/no):   Pharmacy phone number: [phone]       Details to clarify the request:Sheryl stated pt was d/c from hospice and is now in need of all Rxs.        Jas Tilley

## 2021-10-12 NOTE — TELEPHONE ENCOUNTER
Haloperidol is a new medication. How is she taking this? What dose? I assume the haloperidol replaces olanzapine    Last medicine on the list, Banophen is Benadryl.   Discourage using this because of significant side effects

## 2021-10-14 ENCOUNTER — TELEPHONE (OUTPATIENT)
Dept: FAMILY MEDICINE CLINIC | Age: 86
End: 2021-10-14

## 2021-10-14 DIAGNOSIS — F03.92 DEMENTIA WITH PSYCHOSIS: ICD-10-CM

## 2021-10-14 DIAGNOSIS — I10 PRIMARY HYPERTENSION: ICD-10-CM

## 2021-10-14 DIAGNOSIS — F02.81 ALZHEIMER'S DEMENTIA WITH BEHAVIORAL DISTURBANCE, UNSPECIFIED TIMING OF DEMENTIA ONSET: Primary | ICD-10-CM

## 2021-10-14 DIAGNOSIS — G30.9 ALZHEIMER'S DEMENTIA WITH BEHAVIORAL DISTURBANCE, UNSPECIFIED TIMING OF DEMENTIA ONSET: Primary | ICD-10-CM

## 2021-10-14 DIAGNOSIS — E11.9 TYPE 2 DIABETES MELLITUS WITHOUT COMPLICATION, UNSPECIFIED WHETHER LONG TERM INSULIN USE (HCC): ICD-10-CM

## 2021-10-14 NOTE — TELEPHONE ENCOUNTER
Savanna Bell ([address])   Calling on behalf of pt; Needs an order for a home health aid\ home assistance; Please call Savanna Bell back       Thank You

## 2021-10-15 ENCOUNTER — TELEPHONE (OUTPATIENT)
Dept: FAMILY MEDICINE CLINIC | Age: 86
End: 2021-10-15

## 2021-10-15 DIAGNOSIS — E78.2 MIXED HYPERLIPIDEMIA: ICD-10-CM

## 2021-10-15 DIAGNOSIS — F03.92 DEMENTIA WITH PSYCHOSIS: ICD-10-CM

## 2021-10-15 DIAGNOSIS — G30.9 ALZHEIMER'S DEMENTIA WITH BEHAVIORAL DISTURBANCE, UNSPECIFIED TIMING OF DEMENTIA ONSET: Primary | ICD-10-CM

## 2021-10-15 DIAGNOSIS — E11.9 TYPE 2 DIABETES MELLITUS WITHOUT COMPLICATION, UNSPECIFIED WHETHER LONG TERM INSULIN USE (HCC): ICD-10-CM

## 2021-10-15 DIAGNOSIS — I10 PRIMARY HYPERTENSION: ICD-10-CM

## 2021-10-15 DIAGNOSIS — F02.81 ALZHEIMER'S DEMENTIA WITH BEHAVIORAL DISTURBANCE, UNSPECIFIED TIMING OF DEMENTIA ONSET: Primary | ICD-10-CM

## 2021-10-15 NOTE — TELEPHONE ENCOUNTER
Referral written for , please send to [facility] office. Please ask family to schedule visit to discuss needs, pt has not been seen in person in over a year, ideally we could have an in person visit rather than virtual. However if virtual is the only option then this would be a good place to start.

## 2021-10-15 NOTE — TELEPHONE ENCOUNTER
Referral has been printed and faxed. Tried contacting Raheel with the number included in the message and it was a busy signal like the number didn't exist. Called the home number that is listed for the patient and her daughter answered saying that was her number and not the patient's number. Unable to contact anyone to get pt scheduled for a VV.

## 2021-10-15 NOTE — TELEPHONE ENCOUNTER
----- Message from Onesimo Fitch sent at 10/15/2021  8:57 AM EDT -----  Regarding: /telephone  Contact: [phone]  General Message/Vendor Calls    Caller's first and last name: Lisandro Saucedo      Reason for call: [address] discharged her back to home health, wants a different hospice office to come evaluate her      Callback required yes/no and why: with any questions       Best contact number(s): Raheel(POA) [phone] Sol(nurse) [phone]      Details to clarify the request: Send referral for another hospice office come evaluate her, [facility]

## 2021-10-15 NOTE — TELEPHONE ENCOUNTER
/telephone  [facility]: Today  [facility]  General Message/Vendor Calls     Caller's first and last name: Gayle Sutton       Reason for call: [facility] discharged her back to home health, wants a different hospice office to come evaluate her       Callback required yes/no and why: with any questions       Best contact number(s): Raheel(POA) [phone] Sol(nurse) [phone]       Details to clarify the request: Fax referral to [facility] or [phone].  Does not want to be seen by [facility]

## 2021-10-18 ENCOUNTER — TELEPHONE (OUTPATIENT)
Dept: FAMILY MEDICINE CLINIC | Age: 86
End: 2021-10-18

## 2021-10-18 RX ORDER — LEVOTHYROXINE SODIUM 88 UG/1
88 TABLET ORAL
Qty: 90 TABLET | Refills: 3 | Status: SHIPPED | OUTPATIENT
Start: 2021-10-18

## 2021-10-18 RX ORDER — HALOPERIDOL 2 MG/1
2 TABLET ORAL
COMMUNITY
Start: 2021-07-15

## 2021-10-18 RX ORDER — ALENDRONATE SODIUM 70 MG/1
70 TABLET ORAL
Qty: 12 TABLET | Refills: 1 | Status: SHIPPED | OUTPATIENT
Start: 2021-10-18

## 2021-10-18 RX ORDER — HALOPERIDOL 2 MG/1
2 TABLET ORAL
Status: CANCELLED | OUTPATIENT
Start: 2021-10-18

## 2021-10-18 NOTE — TELEPHONE ENCOUNTER
Dr. Babar Travis  Received: Today  Luisana Brito  Phone Number:  [phone] (Call me)   General Message/Vendor Calls     Caller's first and last name: Nurse Maranda Alonso [facility])       Reason for call: Would like to know if prescriptions were sent to [facility] required yes/no and why: yes/follow up       Best contact number(s): [phone]       Details to clarify the request: Would also like to give office phone number for [address] [place]: [phone]. Nurse Maranda Alonso says PT is out of medications and needs as soon as possible.        Clint Anders

## 2021-10-18 NOTE — TELEPHONE ENCOUNTER
Vivian De Anda states they will still be treating the patient. The family is upset because they still expect to receive hospice services under home health care. Vivian De Anda states to go ahead and sent the order to hospice since its the family request but she doesn't think she will qualify. She also states pt is still in need of the 3 attached medications. Spoke with Raheel who is pts POA/ grandson and his wanted to be sure that Dr. WILKERSON [facility] would still be Pts PCP and he wanted to be sure they had all of the medications they needed. He was also concern about her feet and scheduled an appointment for Friday.

## 2021-10-18 NOTE — TELEPHONE ENCOUNTER
Did the hospice referral written 10/17 get faxed? Sent in Synthroid and Fosamax prescriptions    Haldol prescription does not seem appropriate. This was initiated by hospice to be given \"2 mg 3 times daily as needed for agitation. \"  This is very much a hospice medication.   We will discuss this further on Friday

## 2021-10-22 ENCOUNTER — TELEPHONE (OUTPATIENT)
Dept: FAMILY MEDICINE CLINIC | Age: 86
End: 2021-10-22

## 2021-10-22 NOTE — TELEPHONE ENCOUNTER
Spoke with [place] in [facility] [address] in [facility], [place], and Raheel to get this matter resolved for the family. All recents refills have been transferred from [place] [address] in [facility] to [place] [address] in [facility]. There were 6 sent her by Dr. Kathlee Dance. Raheel has been notified that they will be ready for  by 6pm today. Raheel also states home health nurse Rainer Head left a note at the house for him to ask Dr. Kathlee Dance to increase Haloperidol that patient is currently taking to help her sleep at night. Pt is currently taking 2mg but it does not seem to be helping. Pt is currently at the hospital and Raheel is unsure if she will be staying tonight. Please Advise.

## 2021-10-22 NOTE — TELEPHONE ENCOUNTER
Daughter called regarding patient. She stated patient needs a refill on 6 medications but does not know the name of any of the medications, daughter told me to call home care nurse to get name of prescriptions, home care was unsure which medications patient needs.  Daughter also gave me another name to call regarding medication, Name is [address] [place] [phone]

## 2021-10-25 NOTE — TELEPHONE ENCOUNTER
Raheel notified and voiced understanding. He states they were still unable to get the medication refilled some of the rx was too early to fill.  Pt is still currently in the hospital and he will call us for an appointment once she's discharged

## 2022-03-18 PROBLEM — F32.A DEPRESSIVE DISORDER: Status: ACTIVE | Noted: 2019-12-11

## 2022-03-18 PROBLEM — E03.9 HYPOTHYROIDISM: Status: ACTIVE | Noted: 2019-10-01

## 2022-03-18 PROBLEM — E78.2 MIXED HYPERLIPIDEMIA: Status: ACTIVE | Noted: 2019-12-11

## 2023-05-06 RX ORDER — LEVOTHYROXINE SODIUM 88 UG/1
TABLET ORAL
COMMUNITY
Start: 2021-10-18

## 2023-05-06 RX ORDER — ALENDRONATE SODIUM 70 MG/1
TABLET ORAL
COMMUNITY
Start: 2021-10-18

## 2023-05-06 RX ORDER — HALOPERIDOL 2 MG/1
TABLET ORAL EVERY 4 HOURS PRN
COMMUNITY
Start: 2021-07-15

## 2023-05-06 RX ORDER — LEVOTHYROXINE SODIUM 0.07 MG/1
TABLET ORAL
COMMUNITY
Start: 2021-12-13

## 2023-05-06 RX ORDER — CALCIUM CARBONATE 750 MG/1
TABLET, CHEWABLE ORAL DAILY
COMMUNITY
Start: 2021-03-04

## 2023-05-06 RX ORDER — ASPIRIN 81 MG/1
TABLET ORAL DAILY
COMMUNITY
Start: 2020-11-30

## 2023-05-06 RX ORDER — CITALOPRAM 20 MG/1
TABLET ORAL
COMMUNITY
Start: 2021-12-13

## 2023-05-06 RX ORDER — LOSARTAN POTASSIUM 50 MG/1
TABLET ORAL
COMMUNITY
Start: 2021-10-12

## 2023-05-06 RX ORDER — ACETAMINOPHEN 160 MG/1
BAR, CHEWABLE ORAL EVERY 4 HOURS PRN
COMMUNITY
Start: 2021-03-04

## 2023-05-06 RX ORDER — LIDOCAINE 50 MG/G
PATCH TOPICAL
COMMUNITY
Start: 2020-08-04

## 2023-05-06 RX ORDER — FUROSEMIDE 40 MG/1
TABLET ORAL DAILY
COMMUNITY
Start: 2021-10-12

## 2023-05-06 RX ORDER — OLANZAPINE 2.5 MG/1
TABLET ORAL
COMMUNITY
Start: 2021-06-08